# Patient Record
Sex: MALE | Race: OTHER | HISPANIC OR LATINO
[De-identification: names, ages, dates, MRNs, and addresses within clinical notes are randomized per-mention and may not be internally consistent; named-entity substitution may affect disease eponyms.]

---

## 2021-07-06 PROBLEM — Z00.00 ENCOUNTER FOR PREVENTIVE HEALTH EXAMINATION: Status: ACTIVE | Noted: 2021-07-06

## 2021-07-09 ENCOUNTER — APPOINTMENT (OUTPATIENT)
Dept: NEUROSURGERY | Facility: CLINIC | Age: 46
End: 2021-07-09
Payer: MEDICAID

## 2021-07-09 PROCEDURE — 99204 OFFICE O/P NEW MOD 45 MIN: CPT

## 2021-07-09 PROCEDURE — 99072 ADDL SUPL MATRL&STAF TM PHE: CPT

## 2021-07-12 NOTE — PHYSICAL EXAM
[General Appearance - Alert] : alert [General Appearance - In No Acute Distress] : in no acute distress [Oriented To Time, Place, And Person] : oriented to person, place, and time [Impaired Insight] : insight and judgment were intact [4] : L3 quadriceps 4/5 [5] : L4/5 ankle dorsiflexors 5/5 [Straight-Leg Raise Test - Left] : straight leg raise of the left leg was positive [Antalgic] : antalgic [Sclera] : the sclera and conjunctiva were normal [PERRL With Normal Accommodation] : pupils were equal in size, round, reactive to light, with normal accommodation [Hearing Threshold Finger Rub Not [place]] : hearing was normal [Neck Appearance] : the appearance of the neck was normal [] : no respiratory distress [Respiration, Rhythm And Depth] : normal respiratory rhythm and effort [Edema] : there was no peripheral edema [Involuntary Movements] : no involuntary movements were seen [Skin Color & Pigmentation] : normal skin color and pigmentation [Tremor] : no tremor present [FreeTextEntry8] : a [FreeTextEntry1] : amb with cane, steady gait, mild BLE weakness, RUE weakness/ hand mild atrophy

## 2021-07-12 NOTE — DATA REVIEWED
[de-identified] : 6/14/21 [facility] - MRI Lumbar wo - Disc viewed/ report in allscripts

## 2021-07-12 NOTE — REVIEW OF SYSTEMS
[Hand Weakness] :  hand weakness [Leg Weakness] : leg weakness [Numbness] : numbness [Tingling] : tingling [Difficulty Walking] : difficulty walking [Limping] : limping [As Noted in HPI] : as noted in HPI [Limb Pain] : limb pain [Negative] : Endocrine [Abdominal Pain] : no abdominal pain [Vomiting] : no vomiting [Diarrhea] : no diarrhea [Incontinence] : no incontinence [Skin Lesions] : no skin lesions [Easy Bleeding] : no tendency for easy bleeding [Easy Bruising] : no tendency for easy bruising [de-identified] : Denies a/c, a/p

## 2021-07-12 NOTE — REASON FOR VISIT
[Consultation] : a consultation visit [Referred By: _________] : Patient was referred by MICAH [FreeTextEntry1] : chronic back pain

## 2021-07-12 NOTE — HISTORY OF PRESENT ILLNESS
[> 3 months] : more  than 3 months [FreeTextEntry1] : chronic back/ Right leg pain with onset of about 8 years [de-identified] : 45 yo male with PMH of HTN, chronic neck and back pain, Lumbar surgery 2002 ([place] ?).  He complains of years of neck, RUE, low back and Left leg pain.  He has been in pain management for about 8 years currently with Dr. Delgadillo for about 2 years.  He has received multiple injections both cervical and lumbar, PT for years, with non lasting partial relief.  \par \par 6/14/21 [facility] - MRI lumbar wo - report scanned in allscripts\par \par Pain:\par Cervical - neck - RUE pain/ weakness of hand, atrophy, lateral forearm, 4th,5th digits\par Pain: Low back\par Left LE - severe pain\par aggravated with ambulation, standing\par Pain management:  Dr. Delgadillo - many years - multiple modalities, many injections/ ESCI - no relief\par PT many years - non lasting partial releif\par Pain medications currently include Oxycodone 5 mg, Etodolac, and Gabapentin 600 mg 1 tab qd.

## 2021-07-21 ENCOUNTER — RESULT REVIEW (OUTPATIENT)
Age: 46
End: 2021-07-21

## 2021-07-21 ENCOUNTER — OUTPATIENT (OUTPATIENT)
Dept: OUTPATIENT SERVICES | Facility: HOSPITAL | Age: 46
LOS: 1 days | End: 2021-07-21
Payer: MEDICAID

## 2021-07-21 ENCOUNTER — APPOINTMENT (OUTPATIENT)
Dept: CT IMAGING | Facility: CLINIC | Age: 46
End: 2021-07-21

## 2021-07-21 ENCOUNTER — APPOINTMENT (OUTPATIENT)
Dept: RADIOLOGY | Facility: CLINIC | Age: 46
End: 2021-07-21

## 2021-07-21 PROCEDURE — 72125 CT NECK SPINE W/O DYE: CPT | Mod: 26

## 2021-07-21 PROCEDURE — 72110 X-RAY EXAM L-2 SPINE 4/>VWS: CPT | Mod: 26

## 2021-07-21 PROCEDURE — 72040 X-RAY EXAM NECK SPINE 2-3 VW: CPT | Mod: 26

## 2021-08-13 ENCOUNTER — APPOINTMENT (OUTPATIENT)
Dept: NEUROSURGERY | Facility: CLINIC | Age: 46
End: 2021-08-13
Payer: MEDICAID

## 2021-08-20 ENCOUNTER — APPOINTMENT (OUTPATIENT)
Dept: NEUROSURGERY | Facility: CLINIC | Age: 46
End: 2021-08-20
Payer: MEDICAID

## 2021-08-20 VITALS
SYSTOLIC BLOOD PRESSURE: 118 MMHG | HEIGHT: 68 IN | OXYGEN SATURATION: 99 % | BODY MASS INDEX: 24.25 KG/M2 | RESPIRATION RATE: 12 BRPM | TEMPERATURE: 98.7 F | WEIGHT: 160 LBS | DIASTOLIC BLOOD PRESSURE: 78 MMHG | HEART RATE: 56 BPM

## 2021-08-20 DIAGNOSIS — M54.9 DORSALGIA, UNSPECIFIED: ICD-10-CM

## 2021-08-20 PROCEDURE — 99214 OFFICE O/P EST MOD 30 MIN: CPT

## 2021-08-20 PROCEDURE — 99072 ADDL SUPL MATRL&STAF TM PHE: CPT

## 2021-08-27 PROBLEM — M54.9 PAIN IN BACK: Status: ACTIVE | Noted: 2021-08-27

## 2021-09-04 NOTE — HISTORY OF PRESENT ILLNESS
[FreeTextEntry1] : 45 yo male with PMH of HTN, chronic neck and back pain, Lumbar surgery 2002 ([place] ?).  He complains of years of neck, RUE, low back and Left leg pain.  He has been in pain management for about 8 years currently with Dr. Delgadillo for about 2 years.  He has received multiple injections both cervical and lumbar, PT for years, with non lasting partial relief.  \par \par Also complains of gait imbalance \par \par 6/14/21 [facility] - MRI lumbar wo - report scanned in allscripts\par \par Pain:\par Cervical - neck - RUE pain/ weakness of hand, atrophy, lateral forearm, 4th,5th digits\par Pain: Low back\par Left LE - severe pain\par aggravated with ambulation, standing\par Pain management:  Dr. Delgadillo - many years - multiple modalities, many injections/ ESCI - no relief\par PT many years - non lasting partial releif\par Pain medications currently include Oxycodone 5 mg, Etodolac, and Gabapentin 600 mg 1 tab qd.

## 2021-09-04 NOTE — PHYSICAL EXAM
[General Appearance - Alert] : alert [General Appearance - In No Acute Distress] : in no acute distress [Oriented To Time, Place, And Person] : oriented to person, place, and time [Impaired Insight] : insight and judgment were intact [4] : L3 quadriceps 4/5 [5] : L4/5 ankle dorsiflexors 5/5 [Straight-Leg Raise Test - Left] : straight leg raise of the left leg was positive [Antalgic] : antalgic [Sclera] : the sclera and conjunctiva were normal [PERRL With Normal Accommodation] : pupils were equal in size, round, reactive to light, with normal accommodation [Hearing Threshold Finger Rub Not [place]] : hearing was normal [Neck Appearance] : the appearance of the neck was normal [] : no respiratory distress [Respiration, Rhythm And Depth] : normal respiratory rhythm and effort [Edema] : there was no peripheral edema [Involuntary Movements] : no involuntary movements were seen [Skin Color & Pigmentation] : normal skin color and pigmentation [Tremor] : no tremor present [FreeTextEntry1] : amb with cane, steady gait, mild BLE weakness, RUE weakness/ hand mild atrophy

## 2021-09-04 NOTE — REASON FOR VISIT
[FreeTextEntry1] : [facility] - reports scanned to allscripts\par  \par 7/21/21\par CT Cervical wo - full report below \par Xrays Cervical flex/ext\par Xrasy Lumbar flex/ext\par \par ?? CT Lumbar

## 2021-09-04 NOTE — ASSESSMENT
[FreeTextEntry1] : MRI Thoracic spine to evaluate soft tissue, nerves, and spinal canal to assess for thoracic cord compression with gait and balance difficulties.\par \par Patient is most symptomatic from lumbar stenosis with spondylolisthesis\par \par Will plan for L4-5 transforaminal lumbar interbody fusion

## 2021-09-04 NOTE — RESULTS/DATA
[FreeTextEntry1] : [facility]\par    [facility] Department of Radiology\par   Radiology Report\par \par Patient Name: JUANIS VILLEGAS   Report Date: 23-Jul-2021 19:22.00 \par Patient ID: [id number] ([id number]), [id number] ([id number])  Accession No.: [id number] \par Patient Birth Date: 1975  Report Status: F \par Referring Physician: [id number] MANIATIS-FITTING XOCHITL   Reason For Study: Z00.8  \par \par EXAM: CT CERVICAL SPINE\par \par PROCEDURE DATE: 07/21/2021\par \par INTERPRETATION: CLINICAL INDICATIONS: 0Z00.8, preop\par \par COMPARISON: X-ray dated 7/21/2021.\par \par TECHNIQUE: Noncontrast CT of the cervical spine. Multiple contiguous axial images through the cervical spine as well as multiplanar reformatted images are submitted for interpretation without the administration of intravenous contrast.\par \par FINDINGS: Moderate chronic height loss involves the C4-C5 and C6 vertebral bodies. Mild chronic height loss involves the C3 vertebral body. Large anterior bridging osteophytes at the C3/C4 through C6/C7 levels. Mild multilevel facet arthropathy. Moderate multilevel uncovertebral hypertrophy. Small 5 mm degenerative calcifications adjacent to the bilateral C1 lateral masses immediately. This is best seen on image 21 of series 3. There is no evidence for acute fracture. A normal lordosis is noted. Craniocervical junction is normal. The remaining cervicovertebral body heights and remaining intervertebral disc spaces are preserved. There is no prevertebral soft tissue abnormality. The odontoid process is intact.\par \par Thyroid gland is unremarkable. The airway is patent. The upper lung apices are unremarkable.\par \par Evaluation of the individual levels:\par \par C2/C3 level: Tiny midline disc protrusion.No spinal canal stenosis or foraminal narrowing.\par \par C3/C4 level: Broad-based ridging causing moderate left-sided foraminal narrowing. No spinal canal stenosis or right-sided foraminal narrowing.\par \par C4/C5 level: Broad-based ridging causing severe left-sided foraminal narrowing moderate to severe right-sided foraminal narrowing. Mild spinal canal stenosis.\par \par C5/C6 level: Broad-based ridging causing severe bilateral foraminal narrowing. No spinal canal stenosis.\par \par C6/C7 level: Broad-based ridging causing moderate right-sided foraminal narrowing and mild left-sided foraminal narrowing. No spinal canal stenosis.\par \par C7/T1 level: No spinal canal stenosis or foraminal narrowing.\par \par Consider MRI as clinically warranted.\par \par IMPRESSION:\par \par Moderate spondylosis, as above. No acute osseous abnormality.\par \par --- End of Report ---\par \par MARIA EUGENIA MADDOX MD; Attending Radiologist\par This document has been electronically signed. Jul 23 2021 7:22PM

## 2021-09-04 NOTE — REVIEW OF SYSTEMS
[Hand Weakness] :  hand weakness [Leg Weakness] : leg weakness [Numbness] : numbness [Tingling] : tingling [Difficulty Walking] : difficulty walking [Limping] : limping [As Noted in HPI] : as noted in HPI [Limb Pain] : limb pain [Negative] : Endocrine [Abdominal Pain] : no abdominal pain [Vomiting] : no vomiting [Diarrhea] : no diarrhea [Incontinence] : no incontinence [Skin Lesions] : no skin lesions [Easy Bleeding] : no tendency for easy bleeding [Easy Bruising] : no tendency for easy bruising [de-identified] : Denies a/c, a/p

## 2021-09-07 ENCOUNTER — RESULT REVIEW (OUTPATIENT)
Age: 46
End: 2021-09-07

## 2021-09-07 ENCOUNTER — OUTPATIENT (OUTPATIENT)
Dept: OUTPATIENT SERVICES | Facility: HOSPITAL | Age: 46
LOS: 1 days | End: 2021-09-07

## 2021-09-07 ENCOUNTER — APPOINTMENT (OUTPATIENT)
Dept: MRI IMAGING | Facility: CLINIC | Age: 46
End: 2021-09-07
Payer: MEDICAID

## 2021-09-07 ENCOUNTER — APPOINTMENT (OUTPATIENT)
Dept: CT IMAGING | Facility: CLINIC | Age: 46
End: 2021-09-07
Payer: MEDICAID

## 2021-09-07 PROCEDURE — 72131 CT LUMBAR SPINE W/O DYE: CPT | Mod: 26

## 2021-09-21 ENCOUNTER — APPOINTMENT (OUTPATIENT)
Dept: NEUROSURGERY | Facility: CLINIC | Age: 46
End: 2021-09-21

## 2021-09-30 ENCOUNTER — TRANSCRIPTION ENCOUNTER (OUTPATIENT)
Age: 46
End: 2021-09-30

## 2021-09-30 VITALS
OXYGEN SATURATION: 100 % | HEIGHT: 68 IN | HEART RATE: 53 BPM | WEIGHT: 157.63 LBS | SYSTOLIC BLOOD PRESSURE: 108 MMHG | DIASTOLIC BLOOD PRESSURE: 61 MMHG | TEMPERATURE: 98 F | RESPIRATION RATE: 16 BRPM

## 2021-10-01 ENCOUNTER — APPOINTMENT (OUTPATIENT)
Dept: NEUROSURGERY | Facility: HOSPITAL | Age: 46
End: 2021-10-01

## 2021-10-01 ENCOUNTER — INPATIENT (INPATIENT)
Facility: HOSPITAL | Age: 46
LOS: 2 days | Discharge: HOME | DRG: 455 | End: 2021-10-04
Attending: STUDENT IN AN ORGANIZED HEALTH CARE EDUCATION/TRAINING PROGRAM | Admitting: STUDENT IN AN ORGANIZED HEALTH CARE EDUCATION/TRAINING PROGRAM
Payer: COMMERCIAL

## 2021-10-01 DIAGNOSIS — Z98.890 OTHER SPECIFIED POSTPROCEDURAL STATES: Chronic | ICD-10-CM

## 2021-10-01 DIAGNOSIS — M51.26 OTHER INTERVERTEBRAL DISC DISPLACEMENT, LUMBAR REGION: ICD-10-CM

## 2021-10-01 LAB
ANION GAP SERPL CALC-SCNC: 7 MMOL/L — SIGNIFICANT CHANGE UP (ref 5–17)
BASOPHILS # BLD AUTO: 0.04 K/UL — SIGNIFICANT CHANGE UP (ref 0–0.2)
BASOPHILS NFR BLD AUTO: 0.4 % — SIGNIFICANT CHANGE UP (ref 0–2)
BLD GP AB SCN SERPL QL: NEGATIVE — SIGNIFICANT CHANGE UP
BUN SERPL-MCNC: 17 MG/DL — SIGNIFICANT CHANGE UP (ref 7–23)
CALCIUM SERPL-MCNC: 9.9 MG/DL — SIGNIFICANT CHANGE UP (ref 8.4–10.5)
CHLORIDE SERPL-SCNC: 104 MMOL/L — SIGNIFICANT CHANGE UP (ref 96–108)
CO2 SERPL-SCNC: 31 MMOL/L — SIGNIFICANT CHANGE UP (ref 22–31)
CREAT SERPL-MCNC: 1.3 MG/DL — SIGNIFICANT CHANGE UP (ref 0.5–1.3)
EOSINOPHIL # BLD AUTO: 0.01 K/UL — SIGNIFICANT CHANGE UP (ref 0–0.5)
EOSINOPHIL NFR BLD AUTO: 0.1 % — SIGNIFICANT CHANGE UP (ref 0–6)
GLUCOSE SERPL-MCNC: 137 MG/DL — HIGH (ref 70–99)
HCT VFR BLD CALC: 43.2 % — SIGNIFICANT CHANGE UP (ref 39–50)
HGB BLD-MCNC: 14 G/DL — SIGNIFICANT CHANGE UP (ref 13–17)
IMM GRANULOCYTES NFR BLD AUTO: 1 % — SIGNIFICANT CHANGE UP (ref 0–1.5)
LYMPHOCYTES # BLD AUTO: 1.25 K/UL — SIGNIFICANT CHANGE UP (ref 1–3.3)
LYMPHOCYTES # BLD AUTO: 13.4 % — SIGNIFICANT CHANGE UP (ref 13–44)
MCHC RBC-ENTMCNC: 28.8 PG — SIGNIFICANT CHANGE UP (ref 27–34)
MCHC RBC-ENTMCNC: 32.4 GM/DL — SIGNIFICANT CHANGE UP (ref 32–36)
MCV RBC AUTO: 88.9 FL — SIGNIFICANT CHANGE UP (ref 80–100)
MONOCYTES # BLD AUTO: 0.1 K/UL — SIGNIFICANT CHANGE UP (ref 0–0.9)
MONOCYTES NFR BLD AUTO: 1.1 % — LOW (ref 2–14)
NEUTROPHILS # BLD AUTO: 7.81 K/UL — HIGH (ref 1.8–7.4)
NEUTROPHILS NFR BLD AUTO: 84 % — HIGH (ref 43–77)
NRBC # BLD: 0 /100 WBCS — SIGNIFICANT CHANGE UP (ref 0–0)
PLATELET # BLD AUTO: 163 K/UL — SIGNIFICANT CHANGE UP (ref 150–400)
POTASSIUM SERPL-MCNC: 4 MMOL/L — SIGNIFICANT CHANGE UP (ref 3.5–5.3)
POTASSIUM SERPL-SCNC: 4 MMOL/L — SIGNIFICANT CHANGE UP (ref 3.5–5.3)
RBC # BLD: 4.86 M/UL — SIGNIFICANT CHANGE UP (ref 4.2–5.8)
RBC # FLD: 12.5 % — SIGNIFICANT CHANGE UP (ref 10.3–14.5)
RH IG SCN BLD-IMP: POSITIVE — SIGNIFICANT CHANGE UP
SODIUM SERPL-SCNC: 142 MMOL/L — SIGNIFICANT CHANGE UP (ref 135–145)
WBC # BLD: 9.3 K/UL — SIGNIFICANT CHANGE UP (ref 3.8–10.5)
WBC # FLD AUTO: 9.3 K/UL — SIGNIFICANT CHANGE UP (ref 3.8–10.5)

## 2021-10-01 PROCEDURE — 61783 SCAN PROC SPINAL: CPT | Mod: 59

## 2021-10-01 PROCEDURE — 22853 INSJ BIOMECHANICAL DEVICE: CPT | Mod: 80

## 2021-10-01 PROCEDURE — 72131 CT LUMBAR SPINE W/O DYE: CPT | Mod: 26

## 2021-10-01 PROCEDURE — 22633 ARTHRD CMBN 1NTRSPC LUMBAR: CPT | Mod: 62

## 2021-10-01 PROCEDURE — 20939 BONE MARROW ASPIR BONE GRFG: CPT | Mod: 80

## 2021-10-01 PROCEDURE — 22853 INSJ BIOMECHANICAL DEVICE: CPT

## 2021-10-01 PROCEDURE — 63047 LAM FACETEC & FORAMOT LUMBAR: CPT | Mod: 59,62

## 2021-10-01 PROCEDURE — 22840 INSERT SPINE FIXATION DEVICE: CPT

## 2021-10-01 PROCEDURE — 20939 BONE MARROW ASPIR BONE GRFG: CPT

## 2021-10-01 PROCEDURE — 61783 SCAN PROC SPINAL: CPT | Mod: 80

## 2021-10-01 PROCEDURE — 63047 LAM FACETEC & FORAMOT LUMBAR: CPT | Mod: 62,59

## 2021-10-01 PROCEDURE — 22840 INSERT SPINE FIXATION DEVICE: CPT | Mod: 80

## 2021-10-01 RX ORDER — GABAPENTIN 400 MG/1
1 CAPSULE ORAL
Qty: 0 | Refills: 0 | DISCHARGE

## 2021-10-01 RX ORDER — BUPRENORPHINE AND NALOXONE 2; .5 MG/1; MG/1
1 TABLET SUBLINGUAL
Qty: 0 | Refills: 0 | DISCHARGE

## 2021-10-01 RX ORDER — CELECOXIB 200 MG/1
200 CAPSULE ORAL ONCE
Refills: 0 | Status: COMPLETED | OUTPATIENT
Start: 2021-10-01 | End: 2021-10-01

## 2021-10-01 RX ORDER — METHOCARBAMOL 500 MG/1
500 TABLET, FILM COATED ORAL EVERY 8 HOURS
Refills: 0 | Status: DISCONTINUED | OUTPATIENT
Start: 2021-10-01 | End: 2021-10-04

## 2021-10-01 RX ORDER — SENNA PLUS 8.6 MG/1
2 TABLET ORAL AT BEDTIME
Refills: 0 | Status: DISCONTINUED | OUTPATIENT
Start: 2021-10-01 | End: 2021-10-04

## 2021-10-01 RX ORDER — ACETAMINOPHEN 500 MG
1000 TABLET ORAL ONCE
Refills: 0 | Status: COMPLETED | OUTPATIENT
Start: 2021-10-01 | End: 2021-10-01

## 2021-10-01 RX ORDER — SODIUM CHLORIDE 9 MG/ML
1000 INJECTION, SOLUTION INTRAVENOUS
Refills: 0 | Status: DISCONTINUED | OUTPATIENT
Start: 2021-10-01 | End: 2021-10-02

## 2021-10-01 RX ORDER — METOCLOPRAMIDE HCL 10 MG
10 TABLET ORAL ONCE
Refills: 0 | Status: DISCONTINUED | OUTPATIENT
Start: 2021-10-01 | End: 2021-10-04

## 2021-10-01 RX ORDER — POVIDONE-IODINE 5 %
1 AEROSOL (ML) TOPICAL ONCE
Refills: 0 | Status: COMPLETED | OUTPATIENT
Start: 2021-10-01 | End: 2021-10-01

## 2021-10-01 RX ORDER — GABAPENTIN 400 MG/1
300 CAPSULE ORAL ONCE
Refills: 0 | Status: COMPLETED | OUTPATIENT
Start: 2021-10-01 | End: 2021-10-01

## 2021-10-01 RX ORDER — LISINOPRIL 2.5 MG/1
20 TABLET ORAL DAILY
Refills: 0 | Status: DISCONTINUED | OUTPATIENT
Start: 2021-10-02 | End: 2021-10-04

## 2021-10-01 RX ORDER — HYDROMORPHONE HYDROCHLORIDE 2 MG/ML
0.5 INJECTION INTRAMUSCULAR; INTRAVENOUS; SUBCUTANEOUS
Refills: 0 | Status: DISCONTINUED | OUTPATIENT
Start: 2021-10-01 | End: 2021-10-04

## 2021-10-01 RX ORDER — ONDANSETRON 8 MG/1
4 TABLET, FILM COATED ORAL EVERY 6 HOURS
Refills: 0 | Status: COMPLETED | OUTPATIENT
Start: 2021-10-01 | End: 2021-10-02

## 2021-10-01 RX ORDER — CLONAZEPAM 1 MG
0.5 TABLET ORAL
Refills: 0 | Status: DISCONTINUED | OUTPATIENT
Start: 2021-10-01 | End: 2021-10-04

## 2021-10-01 RX ORDER — LISINOPRIL/HYDROCHLOROTHIAZIDE 10-12.5 MG
1 TABLET ORAL
Qty: 0 | Refills: 0 | DISCHARGE

## 2021-10-01 RX ORDER — ERGOCALCIFEROL 1.25 MG/1
1 CAPSULE ORAL
Qty: 0 | Refills: 0 | DISCHARGE

## 2021-10-01 RX ORDER — HYDROCHLOROTHIAZIDE 25 MG
12.5 TABLET ORAL DAILY
Refills: 0 | Status: DISCONTINUED | OUTPATIENT
Start: 2021-10-02 | End: 2021-10-02

## 2021-10-01 RX ORDER — GABAPENTIN 400 MG/1
600 CAPSULE ORAL THREE TIMES A DAY
Refills: 0 | Status: DISCONTINUED | OUTPATIENT
Start: 2021-10-01 | End: 2021-10-04

## 2021-10-01 RX ORDER — ENOXAPARIN SODIUM 100 MG/ML
40 INJECTION SUBCUTANEOUS AT BEDTIME
Refills: 0 | Status: DISCONTINUED | OUTPATIENT
Start: 2021-10-02 | End: 2021-10-04

## 2021-10-01 RX ORDER — CHLORHEXIDINE GLUCONATE 213 G/1000ML
1 SOLUTION TOPICAL EVERY 12 HOURS
Refills: 0 | Status: DISCONTINUED | OUTPATIENT
Start: 2021-10-01 | End: 2021-10-01

## 2021-10-01 RX ORDER — OXYCODONE HYDROCHLORIDE 5 MG/1
5 TABLET ORAL EVERY 4 HOURS
Refills: 0 | Status: DISCONTINUED | OUTPATIENT
Start: 2021-10-01 | End: 2021-10-04

## 2021-10-01 RX ORDER — LIDOCAINE 4 G/100G
0 CREAM TOPICAL
Qty: 0 | Refills: 0 | DISCHARGE

## 2021-10-01 RX ORDER — CEFAZOLIN SODIUM 1 G
1000 VIAL (EA) INJECTION EVERY 8 HOURS
Refills: 0 | Status: COMPLETED | OUTPATIENT
Start: 2021-10-01 | End: 2021-10-02

## 2021-10-01 RX ORDER — APREPITANT 80 MG/1
40 CAPSULE ORAL ONCE
Refills: 0 | Status: COMPLETED | OUTPATIENT
Start: 2021-10-01 | End: 2021-10-01

## 2021-10-01 RX ORDER — CLONAZEPAM 1 MG
1 TABLET ORAL
Qty: 0 | Refills: 0 | DISCHARGE

## 2021-10-01 RX ORDER — ACETAMINOPHEN 500 MG
1000 TABLET ORAL EVERY 6 HOURS
Refills: 0 | Status: DISCONTINUED | OUTPATIENT
Start: 2021-10-01 | End: 2021-10-04

## 2021-10-01 RX ADMIN — METHOCARBAMOL 500 MILLIGRAM(S): 500 TABLET, FILM COATED ORAL at 23:20

## 2021-10-01 RX ADMIN — HYDROMORPHONE HYDROCHLORIDE 0.5 MILLIGRAM(S): 2 INJECTION INTRAMUSCULAR; INTRAVENOUS; SUBCUTANEOUS at 13:56

## 2021-10-01 RX ADMIN — Medication 100 MILLIGRAM(S): at 21:45

## 2021-10-01 RX ADMIN — HYDROMORPHONE HYDROCHLORIDE 0.5 MILLIGRAM(S): 2 INJECTION INTRAMUSCULAR; INTRAVENOUS; SUBCUTANEOUS at 14:55

## 2021-10-01 RX ADMIN — APREPITANT 40 MILLIGRAM(S): 80 CAPSULE ORAL at 07:20

## 2021-10-01 RX ADMIN — CELECOXIB 200 MILLIGRAM(S): 200 CAPSULE ORAL at 07:26

## 2021-10-01 RX ADMIN — METHOCARBAMOL 500 MILLIGRAM(S): 500 TABLET, FILM COATED ORAL at 15:41

## 2021-10-01 RX ADMIN — GABAPENTIN 600 MILLIGRAM(S): 400 CAPSULE ORAL at 15:11

## 2021-10-01 RX ADMIN — SENNA PLUS 2 TABLET(S): 8.6 TABLET ORAL at 21:45

## 2021-10-01 RX ADMIN — Medication 100 MILLIGRAM(S): at 15:40

## 2021-10-01 RX ADMIN — HYDROMORPHONE HYDROCHLORIDE 0.5 MILLIGRAM(S): 2 INJECTION INTRAMUSCULAR; INTRAVENOUS; SUBCUTANEOUS at 18:20

## 2021-10-01 RX ADMIN — Medication 1000 MILLIGRAM(S): at 07:27

## 2021-10-01 RX ADMIN — SODIUM CHLORIDE 75 MILLILITER(S): 9 INJECTION, SOLUTION INTRAVENOUS at 15:49

## 2021-10-01 RX ADMIN — HYDROMORPHONE HYDROCHLORIDE 0.5 MILLIGRAM(S): 2 INJECTION INTRAMUSCULAR; INTRAVENOUS; SUBCUTANEOUS at 16:56

## 2021-10-01 RX ADMIN — Medication 1000 MILLIGRAM(S): at 23:20

## 2021-10-01 RX ADMIN — Medication 1000 MILLIGRAM(S): at 07:19

## 2021-10-01 RX ADMIN — GABAPENTIN 600 MILLIGRAM(S): 400 CAPSULE ORAL at 21:56

## 2021-10-01 RX ADMIN — CELECOXIB 200 MILLIGRAM(S): 200 CAPSULE ORAL at 07:19

## 2021-10-01 RX ADMIN — Medication 0.5 MILLIGRAM(S): at 19:41

## 2021-10-01 RX ADMIN — GABAPENTIN 300 MILLIGRAM(S): 400 CAPSULE ORAL at 07:19

## 2021-10-01 RX ADMIN — Medication 1000 MILLIGRAM(S): at 17:27

## 2021-10-01 RX ADMIN — ONDANSETRON 4 MILLIGRAM(S): 8 TABLET, FILM COATED ORAL at 19:43

## 2021-10-01 RX ADMIN — ONDANSETRON 4 MILLIGRAM(S): 8 TABLET, FILM COATED ORAL at 23:20

## 2021-10-01 RX ADMIN — CHLORHEXIDINE GLUCONATE 1 APPLICATION(S): 213 SOLUTION TOPICAL at 07:27

## 2021-10-01 RX ADMIN — Medication 1000 MILLIGRAM(S): at 18:20

## 2021-10-01 RX ADMIN — Medication 1 APPLICATION(S): at 07:26

## 2021-10-01 NOTE — H&P ADULT - HISTORY OF PRESENT ILLNESS
47 yo male with PMH of HTN, chronic neck and back pain, Lumbar surgery 2002 ([place] ?). He complains of years of neck, RUE, low back and Left leg pain. He has been in pain management for about 8 years currently with Dr. Delgadillo for about 2 years. He has received multiple injections both cervical and lumbar, PT for years, with non lasting partial relief.  45 yo male, Right handed, PMH: HTN, chronic neck and back pain, Lumbar surgery 2002 unknown levels and surgeon. He complains of years of neck, RUE radiculopathy, low back and Left leg radiculopathy. He has been in pain management for about 8 years currently with Dr. Delgadillo for about 2 years. He has received multiple steroid injections both cervical and lumbar, PT for years, with non lasting partial relief. Pt reports difficulty with initiating urination. Pt ambulates with a cane.  Pt denies sob, cp, n/v, chills/fever, saddle paresthesia, dysuria.

## 2021-10-01 NOTE — BRIEF OPERATIVE NOTE - NSICDXBRIEFPREOP_GEN_ALL_CORE_FT
PRE-OP DIAGNOSIS:  HNP (herniated nucleus pulposus), lumbar 01-Oct-2021 13:54:53 L4-L5 Diony Almaguer

## 2021-10-01 NOTE — H&P ADULT - ASSESSMENT
47 yo male, Right handed, PMH: HTN, chronic neck and back pain, Lumbar surgery 2002 unknown levels and surgeon. He complains of years of neck, RUE radiculopathy, low back and Left leg radiculopathy. He has been in pain management for about 8 years currently with Dr. Delgadillo for about 2 years. He has received multiple steroid injections both cervical and lumbar, PT for years, with non lasting partial relief. Pt reports difficulty with initiating urination. Pt ambulates with a cane.  Medical clearance is in the chart. Plan for L4-5 transforaminal lumbar interbody fusion with Dr. Alicea and Dr Pride

## 2021-10-01 NOTE — H&P ADULT - NSHPPHYSICALEXAM_GEN_ALL_CORE
AA&OX3, mild discomfort due to neck and lower back pain, coherent speech  CNs II-XII grossly intact  motor:  Right: C5 Biceps 4/5, C7 triceps 4/5, HG 4/5, L2 Iliopsoas 4/5, L3 quadriceps 4/5, L4/5 ankle dorsiflexors 4/5, plantar flex 5/5, EHL 4/5  Left: C5 Biceps 5/5, C7 triceps 5/5, HG 4/5, L2 Iliopsoas 4/5, L3 quadriceps 4/5, L4/5 ankle dorsiflexors 4/5, plantar flex 5/5, EHL 4/5  sensation to LT diminished RUE and LLE, o/w grossly intact.  Back: incision healed well.  Card: S1S2, NSR  Pulm: CTA  Abd: soft, non tender, nondistended, + BS

## 2021-10-01 NOTE — H&P ADULT - NSICDXPASTSURGICALHX_GEN_ALL_CORE_FT
PAST SURGICAL HISTORY:  H/O: knee surgery right    History of back surgery     History of back surgery

## 2021-10-01 NOTE — H&P ADULT - NSICDXPASTMEDICALHX_GEN_ALL_CORE_FT
PAST MEDICAL HISTORY:  Falls weakness to b/l legs; walks with cane for stability; fell down at home about a month ago    H/O fatigue     HTN (hypertension)

## 2021-10-02 DIAGNOSIS — R00.1 BRADYCARDIA, UNSPECIFIED: ICD-10-CM

## 2021-10-02 DIAGNOSIS — I10 ESSENTIAL (PRIMARY) HYPERTENSION: ICD-10-CM

## 2021-10-02 DIAGNOSIS — D72.829 ELEVATED WHITE BLOOD CELL COUNT, UNSPECIFIED: ICD-10-CM

## 2021-10-02 DIAGNOSIS — N18.2 CHRONIC KIDNEY DISEASE, STAGE 2 (MILD): ICD-10-CM

## 2021-10-02 DIAGNOSIS — Z98.890 OTHER SPECIFIED POSTPROCEDURAL STATES: ICD-10-CM

## 2021-10-02 DIAGNOSIS — D64.9 ANEMIA, UNSPECIFIED: ICD-10-CM

## 2021-10-02 LAB
ANION GAP SERPL CALC-SCNC: 9 MMOL/L — SIGNIFICANT CHANGE UP (ref 5–17)
BASOPHILS # BLD AUTO: 0.02 K/UL — SIGNIFICANT CHANGE UP (ref 0–0.2)
BASOPHILS NFR BLD AUTO: 0.1 % — SIGNIFICANT CHANGE UP (ref 0–2)
BUN SERPL-MCNC: 22 MG/DL — SIGNIFICANT CHANGE UP (ref 7–23)
CALCIUM SERPL-MCNC: 9.2 MG/DL — SIGNIFICANT CHANGE UP (ref 8.4–10.5)
CHLORIDE SERPL-SCNC: 104 MMOL/L — SIGNIFICANT CHANGE UP (ref 96–108)
CO2 SERPL-SCNC: 27 MMOL/L — SIGNIFICANT CHANGE UP (ref 22–31)
CREAT SERPL-MCNC: 1.31 MG/DL — HIGH (ref 0.5–1.3)
EOSINOPHIL # BLD AUTO: 0 K/UL — SIGNIFICANT CHANGE UP (ref 0–0.5)
EOSINOPHIL NFR BLD AUTO: 0 % — SIGNIFICANT CHANGE UP (ref 0–6)
GLUCOSE SERPL-MCNC: 110 MG/DL — HIGH (ref 70–99)
HCT VFR BLD CALC: 36.1 % — LOW (ref 39–50)
HGB BLD-MCNC: 12.1 G/DL — LOW (ref 13–17)
IMM GRANULOCYTES NFR BLD AUTO: 0.5 % — SIGNIFICANT CHANGE UP (ref 0–1.5)
LYMPHOCYTES # BLD AUTO: 1.83 K/UL — SIGNIFICANT CHANGE UP (ref 1–3.3)
LYMPHOCYTES # BLD AUTO: 13.1 % — SIGNIFICANT CHANGE UP (ref 13–44)
MAGNESIUM SERPL-MCNC: 1.8 MG/DL — SIGNIFICANT CHANGE UP (ref 1.6–2.6)
MCHC RBC-ENTMCNC: 29.7 PG — SIGNIFICANT CHANGE UP (ref 27–34)
MCHC RBC-ENTMCNC: 33.5 GM/DL — SIGNIFICANT CHANGE UP (ref 32–36)
MCV RBC AUTO: 88.7 FL — SIGNIFICANT CHANGE UP (ref 80–100)
MONOCYTES # BLD AUTO: 0.95 K/UL — HIGH (ref 0–0.9)
MONOCYTES NFR BLD AUTO: 6.8 % — SIGNIFICANT CHANGE UP (ref 2–14)
NEUTROPHILS # BLD AUTO: 11.11 K/UL — HIGH (ref 1.8–7.4)
NEUTROPHILS NFR BLD AUTO: 79.5 % — HIGH (ref 43–77)
NRBC # BLD: 0 /100 WBCS — SIGNIFICANT CHANGE UP (ref 0–0)
PHOSPHATE SERPL-MCNC: 3.6 MG/DL — SIGNIFICANT CHANGE UP (ref 2.5–4.5)
PLATELET # BLD AUTO: 164 K/UL — SIGNIFICANT CHANGE UP (ref 150–400)
POTASSIUM SERPL-MCNC: 4.5 MMOL/L — SIGNIFICANT CHANGE UP (ref 3.5–5.3)
POTASSIUM SERPL-SCNC: 4.5 MMOL/L — SIGNIFICANT CHANGE UP (ref 3.5–5.3)
RBC # BLD: 4.07 M/UL — LOW (ref 4.2–5.8)
RBC # FLD: 12.3 % — SIGNIFICANT CHANGE UP (ref 10.3–14.5)
SODIUM SERPL-SCNC: 140 MMOL/L — SIGNIFICANT CHANGE UP (ref 135–145)
WBC # BLD: 13.98 K/UL — HIGH (ref 3.8–10.5)
WBC # FLD AUTO: 13.98 K/UL — HIGH (ref 3.8–10.5)

## 2021-10-02 PROCEDURE — 99024 POSTOP FOLLOW-UP VISIT: CPT

## 2021-10-02 PROCEDURE — 99254 IP/OBS CNSLTJ NEW/EST MOD 60: CPT | Mod: GC

## 2021-10-02 RX ORDER — SODIUM CHLORIDE 9 MG/ML
500 INJECTION INTRAMUSCULAR; INTRAVENOUS; SUBCUTANEOUS ONCE
Refills: 0 | Status: COMPLETED | OUTPATIENT
Start: 2021-10-02 | End: 2021-10-02

## 2021-10-02 RX ADMIN — GABAPENTIN 600 MILLIGRAM(S): 400 CAPSULE ORAL at 21:52

## 2021-10-02 RX ADMIN — METHOCARBAMOL 500 MILLIGRAM(S): 500 TABLET, FILM COATED ORAL at 21:51

## 2021-10-02 RX ADMIN — LISINOPRIL 20 MILLIGRAM(S): 2.5 TABLET ORAL at 07:15

## 2021-10-02 RX ADMIN — OXYCODONE HYDROCHLORIDE 5 MILLIGRAM(S): 5 TABLET ORAL at 18:41

## 2021-10-02 RX ADMIN — ENOXAPARIN SODIUM 40 MILLIGRAM(S): 100 INJECTION SUBCUTANEOUS at 21:51

## 2021-10-02 RX ADMIN — Medication 0.1 MILLIGRAM(S): at 06:33

## 2021-10-02 RX ADMIN — GABAPENTIN 600 MILLIGRAM(S): 400 CAPSULE ORAL at 06:34

## 2021-10-02 RX ADMIN — SODIUM CHLORIDE 1000 MILLILITER(S): 9 INJECTION INTRAMUSCULAR; INTRAVENOUS; SUBCUTANEOUS at 19:26

## 2021-10-02 RX ADMIN — GABAPENTIN 600 MILLIGRAM(S): 400 CAPSULE ORAL at 13:18

## 2021-10-02 RX ADMIN — Medication 1000 MILLIGRAM(S): at 23:41

## 2021-10-02 RX ADMIN — Medication 1000 MILLIGRAM(S): at 07:30

## 2021-10-02 RX ADMIN — SENNA PLUS 2 TABLET(S): 8.6 TABLET ORAL at 21:51

## 2021-10-02 RX ADMIN — Medication 0.5 MILLIGRAM(S): at 06:33

## 2021-10-02 RX ADMIN — Medication 100 MILLIGRAM(S): at 06:32

## 2021-10-02 RX ADMIN — Medication 1000 MILLIGRAM(S): at 06:34

## 2021-10-02 RX ADMIN — OXYCODONE HYDROCHLORIDE 5 MILLIGRAM(S): 5 TABLET ORAL at 23:41

## 2021-10-02 RX ADMIN — Medication 1000 MILLIGRAM(S): at 00:00

## 2021-10-02 RX ADMIN — Medication 0.5 MILLIGRAM(S): at 17:19

## 2021-10-02 RX ADMIN — Medication 1000 MILLIGRAM(S): at 13:18

## 2021-10-02 RX ADMIN — Medication 1000 MILLIGRAM(S): at 14:18

## 2021-10-02 RX ADMIN — ONDANSETRON 4 MILLIGRAM(S): 8 TABLET, FILM COATED ORAL at 06:34

## 2021-10-02 RX ADMIN — METHOCARBAMOL 500 MILLIGRAM(S): 500 TABLET, FILM COATED ORAL at 13:18

## 2021-10-02 RX ADMIN — OXYCODONE HYDROCHLORIDE 5 MILLIGRAM(S): 5 TABLET ORAL at 19:41

## 2021-10-02 RX ADMIN — Medication 1000 MILLIGRAM(S): at 17:19

## 2021-10-02 RX ADMIN — Medication 1000 MILLIGRAM(S): at 18:19

## 2021-10-02 RX ADMIN — METHOCARBAMOL 500 MILLIGRAM(S): 500 TABLET, FILM COATED ORAL at 07:14

## 2021-10-02 RX ADMIN — Medication 5 MILLIGRAM(S): at 21:51

## 2021-10-02 NOTE — PHYSICAL THERAPY INITIAL EVALUATION ADULT - GENERAL OBSERVATIONS, REHAB EVAL
Patient received ambulating out of bathroom  in NAD on RA, +Hemovac, +Heplock. Cleared by REUBEN Russell. Agreeable to PT.

## 2021-10-02 NOTE — PROGRESS NOTE ADULT - ASSESSMENT
45 yo male, Right handed, PMH: HTN, chronic neck and back pain with RUE and LLE radiculopathy. Lumbar surgery 2002 unknown levels and surgeon. Pt ambulates with a cane at baseline. He is now s/p L4/5 TLIF 10/1/21.

## 2021-10-02 NOTE — PHYSICAL THERAPY INITIAL EVALUATION ADULT - LEVEL OF INDEPENDENCE: SUPINE/SIT, REHAB EVAL
independent Ear Star Wedge Flap Text: The defect edges were debeveled with a #15 blade scalpel.  Given the location of the defect and the proximity to free margins (helical rim) an ear star wedge flap was deemed most appropriate.  Using a sterile surgical marker, the appropriate flap was drawn incorporating the defect and placing the expected incisions between the helical rim and antihelix where possible.  The area thus outlined was incised through and through with a #15 scalpel blade.

## 2021-10-02 NOTE — PHYSICAL THERAPY INITIAL EVALUATION ADULT - PERTINENT HX OF CURRENT PROBLEM, REHAB EVAL
45 yo male, Right handed, PMH: HTN, chronic neck and back pain, Lumbar surgery 2002 unknown levels and surgeon. He complains of years of neck, RUE radiculopathy, low back and Left leg radiculopathy. He has been in pain management for about 8 years currently with Dr. Delgadillo for about 2 years. He has received multiple steroid injections both cervical and lumbar, PT for years, with non lasting partial relief. Now s/p L4/5 TLIF

## 2021-10-03 LAB
ANION GAP SERPL CALC-SCNC: 8 MMOL/L — SIGNIFICANT CHANGE UP (ref 5–17)
BASOPHILS # BLD AUTO: 0.03 K/UL — SIGNIFICANT CHANGE UP (ref 0–0.2)
BASOPHILS NFR BLD AUTO: 0.3 % — SIGNIFICANT CHANGE UP (ref 0–2)
BUN SERPL-MCNC: 19 MG/DL — SIGNIFICANT CHANGE UP (ref 7–23)
CALCIUM SERPL-MCNC: 9 MG/DL — SIGNIFICANT CHANGE UP (ref 8.4–10.5)
CHLORIDE SERPL-SCNC: 106 MMOL/L — SIGNIFICANT CHANGE UP (ref 96–108)
CO2 SERPL-SCNC: 27 MMOL/L — SIGNIFICANT CHANGE UP (ref 22–31)
CREAT SERPL-MCNC: 1.19 MG/DL — SIGNIFICANT CHANGE UP (ref 0.5–1.3)
EOSINOPHIL # BLD AUTO: 0.03 K/UL — SIGNIFICANT CHANGE UP (ref 0–0.5)
EOSINOPHIL NFR BLD AUTO: 0.3 % — SIGNIFICANT CHANGE UP (ref 0–6)
GLUCOSE SERPL-MCNC: 93 MG/DL — SIGNIFICANT CHANGE UP (ref 70–99)
HCT VFR BLD CALC: 34.5 % — LOW (ref 39–50)
HGB BLD-MCNC: 11 G/DL — LOW (ref 13–17)
IMM GRANULOCYTES NFR BLD AUTO: 0.4 % — SIGNIFICANT CHANGE UP (ref 0–1.5)
LYMPHOCYTES # BLD AUTO: 3.98 K/UL — HIGH (ref 1–3.3)
LYMPHOCYTES # BLD AUTO: 40.2 % — SIGNIFICANT CHANGE UP (ref 13–44)
MAGNESIUM SERPL-MCNC: 1.7 MG/DL — SIGNIFICANT CHANGE UP (ref 1.6–2.6)
MCHC RBC-ENTMCNC: 29 PG — SIGNIFICANT CHANGE UP (ref 27–34)
MCHC RBC-ENTMCNC: 31.9 GM/DL — LOW (ref 32–36)
MCV RBC AUTO: 91 FL — SIGNIFICANT CHANGE UP (ref 80–100)
MONOCYTES # BLD AUTO: 0.69 K/UL — SIGNIFICANT CHANGE UP (ref 0–0.9)
MONOCYTES NFR BLD AUTO: 7 % — SIGNIFICANT CHANGE UP (ref 2–14)
NEUTROPHILS # BLD AUTO: 5.12 K/UL — SIGNIFICANT CHANGE UP (ref 1.8–7.4)
NEUTROPHILS NFR BLD AUTO: 51.8 % — SIGNIFICANT CHANGE UP (ref 43–77)
NRBC # BLD: 0 /100 WBCS — SIGNIFICANT CHANGE UP (ref 0–0)
PHOSPHATE SERPL-MCNC: 3 MG/DL — SIGNIFICANT CHANGE UP (ref 2.5–4.5)
PLATELET # BLD AUTO: 133 K/UL — LOW (ref 150–400)
POTASSIUM SERPL-MCNC: 3.9 MMOL/L — SIGNIFICANT CHANGE UP (ref 3.5–5.3)
POTASSIUM SERPL-SCNC: 3.9 MMOL/L — SIGNIFICANT CHANGE UP (ref 3.5–5.3)
RBC # BLD: 3.79 M/UL — LOW (ref 4.2–5.8)
RBC # FLD: 12.8 % — SIGNIFICANT CHANGE UP (ref 10.3–14.5)
SODIUM SERPL-SCNC: 141 MMOL/L — SIGNIFICANT CHANGE UP (ref 135–145)
WBC # BLD: 9.89 K/UL — SIGNIFICANT CHANGE UP (ref 3.8–10.5)
WBC # FLD AUTO: 9.89 K/UL — SIGNIFICANT CHANGE UP (ref 3.8–10.5)

## 2021-10-03 PROCEDURE — 99024 POSTOP FOLLOW-UP VISIT: CPT

## 2021-10-03 PROCEDURE — 72141 MRI NECK SPINE W/O DYE: CPT | Mod: 26

## 2021-10-03 RX ORDER — SERTRALINE 25 MG/1
100 TABLET, FILM COATED ORAL DAILY
Refills: 0 | Status: DISCONTINUED | OUTPATIENT
Start: 2021-10-03 | End: 2021-10-04

## 2021-10-03 RX ORDER — POTASSIUM CHLORIDE 20 MEQ
20 PACKET (EA) ORAL ONCE
Refills: 0 | Status: COMPLETED | OUTPATIENT
Start: 2021-10-03 | End: 2021-10-03

## 2021-10-03 RX ORDER — MAGNESIUM SULFATE 500 MG/ML
2 VIAL (ML) INJECTION ONCE
Refills: 0 | Status: COMPLETED | OUTPATIENT
Start: 2021-10-03 | End: 2021-10-03

## 2021-10-03 RX ADMIN — Medication 1000 MILLIGRAM(S): at 17:17

## 2021-10-03 RX ADMIN — OXYCODONE HYDROCHLORIDE 5 MILLIGRAM(S): 5 TABLET ORAL at 18:19

## 2021-10-03 RX ADMIN — METHOCARBAMOL 500 MILLIGRAM(S): 500 TABLET, FILM COATED ORAL at 21:54

## 2021-10-03 RX ADMIN — Medication 20 MILLIEQUIVALENT(S): at 11:23

## 2021-10-03 RX ADMIN — OXYCODONE HYDROCHLORIDE 5 MILLIGRAM(S): 5 TABLET ORAL at 17:19

## 2021-10-03 RX ADMIN — GABAPENTIN 600 MILLIGRAM(S): 400 CAPSULE ORAL at 21:54

## 2021-10-03 RX ADMIN — Medication 0.5 MILLIGRAM(S): at 05:33

## 2021-10-03 RX ADMIN — OXYCODONE HYDROCHLORIDE 5 MILLIGRAM(S): 5 TABLET ORAL at 00:41

## 2021-10-03 RX ADMIN — Medication 1000 MILLIGRAM(S): at 11:23

## 2021-10-03 RX ADMIN — Medication 1000 MILLIGRAM(S): at 23:01

## 2021-10-03 RX ADMIN — Medication 50 GRAM(S): at 12:20

## 2021-10-03 RX ADMIN — Medication 1000 MILLIGRAM(S): at 05:33

## 2021-10-03 RX ADMIN — GABAPENTIN 600 MILLIGRAM(S): 400 CAPSULE ORAL at 14:47

## 2021-10-03 RX ADMIN — OXYCODONE HYDROCHLORIDE 5 MILLIGRAM(S): 5 TABLET ORAL at 22:53

## 2021-10-03 RX ADMIN — SENNA PLUS 2 TABLET(S): 8.6 TABLET ORAL at 21:54

## 2021-10-03 RX ADMIN — OXYCODONE HYDROCHLORIDE 5 MILLIGRAM(S): 5 TABLET ORAL at 11:23

## 2021-10-03 RX ADMIN — OXYCODONE HYDROCHLORIDE 5 MILLIGRAM(S): 5 TABLET ORAL at 21:53

## 2021-10-03 RX ADMIN — GABAPENTIN 600 MILLIGRAM(S): 400 CAPSULE ORAL at 05:33

## 2021-10-03 RX ADMIN — Medication 1000 MILLIGRAM(S): at 18:17

## 2021-10-03 RX ADMIN — METHOCARBAMOL 500 MILLIGRAM(S): 500 TABLET, FILM COATED ORAL at 13:51

## 2021-10-03 RX ADMIN — SERTRALINE 100 MILLIGRAM(S): 25 TABLET, FILM COATED ORAL at 21:54

## 2021-10-03 RX ADMIN — Medication 1000 MILLIGRAM(S): at 06:53

## 2021-10-03 RX ADMIN — METHOCARBAMOL 500 MILLIGRAM(S): 500 TABLET, FILM COATED ORAL at 05:33

## 2021-10-03 RX ADMIN — Medication 1000 MILLIGRAM(S): at 12:23

## 2021-10-03 RX ADMIN — OXYCODONE HYDROCHLORIDE 5 MILLIGRAM(S): 5 TABLET ORAL at 06:53

## 2021-10-03 RX ADMIN — LISINOPRIL 20 MILLIGRAM(S): 2.5 TABLET ORAL at 05:33

## 2021-10-03 RX ADMIN — Medication 0.5 MILLIGRAM(S): at 17:52

## 2021-10-03 RX ADMIN — ENOXAPARIN SODIUM 40 MILLIGRAM(S): 100 INJECTION SUBCUTANEOUS at 21:53

## 2021-10-03 RX ADMIN — OXYCODONE HYDROCHLORIDE 5 MILLIGRAM(S): 5 TABLET ORAL at 05:33

## 2021-10-03 RX ADMIN — OXYCODONE HYDROCHLORIDE 5 MILLIGRAM(S): 5 TABLET ORAL at 12:23

## 2021-10-03 RX ADMIN — Medication 1000 MILLIGRAM(S): at 00:41

## 2021-10-03 NOTE — OCCUPATIONAL THERAPY INITIAL EVALUATION ADULT - MD ORDER
45 yo male, He complains of years of neck, RUE radiculopathy, low back and Left leg radiculopathy. He has been in pain management for about 8 years currently with Dr. Delgadillo for about 2 years. He has received multiple steroid injections both cervical and lumbar, PT for years, with non lasting partial relief. Pt reports difficulty with initiating urination. pt now s/p L4-5 TLIF

## 2021-10-03 NOTE — OCCUPATIONAL THERAPY INITIAL EVALUATION ADULT - LIVES WITH, PROFILE
Pt lives with family in [facility] with elevator access. Pt at baseline is ind for ADLs and uses straight cane for functional mobility 2/2  balance.

## 2021-10-04 ENCOUNTER — TRANSCRIPTION ENCOUNTER (OUTPATIENT)
Age: 46
End: 2021-10-04

## 2021-10-04 VITALS
DIASTOLIC BLOOD PRESSURE: 78 MMHG | HEART RATE: 64 BPM | OXYGEN SATURATION: 98 % | SYSTOLIC BLOOD PRESSURE: 118 MMHG | TEMPERATURE: 99 F | RESPIRATION RATE: 17 BRPM

## 2021-10-04 DIAGNOSIS — M51.26 OTHER INTERVERTEBRAL DISC DISPLACEMENT, LUMBAR REGION: ICD-10-CM

## 2021-10-04 LAB
ANION GAP SERPL CALC-SCNC: 10 MMOL/L — SIGNIFICANT CHANGE UP (ref 5–17)
BASOPHILS # BLD AUTO: 0.03 K/UL — SIGNIFICANT CHANGE UP (ref 0–0.2)
BASOPHILS NFR BLD AUTO: 0.3 % — SIGNIFICANT CHANGE UP (ref 0–2)
BUN SERPL-MCNC: 16 MG/DL — SIGNIFICANT CHANGE UP (ref 7–23)
CALCIUM SERPL-MCNC: 9.2 MG/DL — SIGNIFICANT CHANGE UP (ref 8.4–10.5)
CHLORIDE SERPL-SCNC: 102 MMOL/L — SIGNIFICANT CHANGE UP (ref 96–108)
CO2 SERPL-SCNC: 27 MMOL/L — SIGNIFICANT CHANGE UP (ref 22–31)
CREAT SERPL-MCNC: 1.01 MG/DL — SIGNIFICANT CHANGE UP (ref 0.5–1.3)
EOSINOPHIL # BLD AUTO: 0.05 K/UL — SIGNIFICANT CHANGE UP (ref 0–0.5)
EOSINOPHIL NFR BLD AUTO: 0.5 % — SIGNIFICANT CHANGE UP (ref 0–6)
GLUCOSE SERPL-MCNC: 92 MG/DL — SIGNIFICANT CHANGE UP (ref 70–99)
HCT VFR BLD CALC: 34.5 % — LOW (ref 39–50)
HGB BLD-MCNC: 11.4 G/DL — LOW (ref 13–17)
IMM GRANULOCYTES NFR BLD AUTO: 0.3 % — SIGNIFICANT CHANGE UP (ref 0–1.5)
LYMPHOCYTES # BLD AUTO: 2.33 K/UL — SIGNIFICANT CHANGE UP (ref 1–3.3)
LYMPHOCYTES # BLD AUTO: 24.8 % — SIGNIFICANT CHANGE UP (ref 13–44)
MAGNESIUM SERPL-MCNC: 1.7 MG/DL — SIGNIFICANT CHANGE UP (ref 1.6–2.6)
MCHC RBC-ENTMCNC: 29.5 PG — SIGNIFICANT CHANGE UP (ref 27–34)
MCHC RBC-ENTMCNC: 33 GM/DL — SIGNIFICANT CHANGE UP (ref 32–36)
MCV RBC AUTO: 89.1 FL — SIGNIFICANT CHANGE UP (ref 80–100)
MONOCYTES # BLD AUTO: 0.67 K/UL — SIGNIFICANT CHANGE UP (ref 0–0.9)
MONOCYTES NFR BLD AUTO: 7.1 % — SIGNIFICANT CHANGE UP (ref 2–14)
NEUTROPHILS # BLD AUTO: 6.29 K/UL — SIGNIFICANT CHANGE UP (ref 1.8–7.4)
NEUTROPHILS NFR BLD AUTO: 67 % — SIGNIFICANT CHANGE UP (ref 43–77)
NRBC # BLD: 0 /100 WBCS — SIGNIFICANT CHANGE UP (ref 0–0)
PHOSPHATE SERPL-MCNC: 3 MG/DL — SIGNIFICANT CHANGE UP (ref 2.5–4.5)
PLATELET # BLD AUTO: 145 K/UL — LOW (ref 150–400)
POTASSIUM SERPL-MCNC: 4 MMOL/L — SIGNIFICANT CHANGE UP (ref 3.5–5.3)
POTASSIUM SERPL-SCNC: 4 MMOL/L — SIGNIFICANT CHANGE UP (ref 3.5–5.3)
RBC # BLD: 3.87 M/UL — LOW (ref 4.2–5.8)
RBC # FLD: 12.4 % — SIGNIFICANT CHANGE UP (ref 10.3–14.5)
SODIUM SERPL-SCNC: 139 MMOL/L — SIGNIFICANT CHANGE UP (ref 135–145)
WBC # BLD: 9.4 K/UL — SIGNIFICANT CHANGE UP (ref 3.8–10.5)
WBC # FLD AUTO: 9.4 K/UL — SIGNIFICANT CHANGE UP (ref 3.8–10.5)

## 2021-10-04 PROCEDURE — 99024 POSTOP FOLLOW-UP VISIT: CPT

## 2021-10-04 RX ORDER — ACETAMINOPHEN 500 MG
0 TABLET ORAL
Qty: 0 | Refills: 0 | DISCHARGE

## 2021-10-04 RX ORDER — SENNA PLUS 8.6 MG/1
2 TABLET ORAL
Qty: 0 | Refills: 0 | DISCHARGE
Start: 2021-10-04

## 2021-10-04 RX ORDER — MAGNESIUM SULFATE 500 MG/ML
2 VIAL (ML) INJECTION ONCE
Refills: 0 | Status: COMPLETED | OUTPATIENT
Start: 2021-10-04 | End: 2021-10-04

## 2021-10-04 RX ORDER — TIZANIDINE 4 MG/1
2 TABLET ORAL
Qty: 0 | Refills: 0 | DISCHARGE

## 2021-10-04 RX ORDER — METHOCARBAMOL 500 MG/1
1 TABLET, FILM COATED ORAL
Qty: 30 | Refills: 0
Start: 2021-10-04

## 2021-10-04 RX ORDER — ETODOLAC 400 MG/1
1 TABLET, FILM COATED ORAL
Qty: 0 | Refills: 0 | DISCHARGE

## 2021-10-04 RX ORDER — OXYCODONE HYDROCHLORIDE 5 MG/1
1 TABLET ORAL
Qty: 20 | Refills: 0
Start: 2021-10-04

## 2021-10-04 RX ORDER — ACETAMINOPHEN 500 MG
2 TABLET ORAL
Qty: 0 | Refills: 0 | DISCHARGE
Start: 2021-10-04

## 2021-10-04 RX ADMIN — Medication 1000 MILLIGRAM(S): at 06:15

## 2021-10-04 RX ADMIN — GABAPENTIN 600 MILLIGRAM(S): 400 CAPSULE ORAL at 05:15

## 2021-10-04 RX ADMIN — Medication 0.5 MILLIGRAM(S): at 05:17

## 2021-10-04 RX ADMIN — OXYCODONE HYDROCHLORIDE 5 MILLIGRAM(S): 5 TABLET ORAL at 03:47

## 2021-10-04 RX ADMIN — Medication 1000 MILLIGRAM(S): at 00:01

## 2021-10-04 RX ADMIN — Medication 1000 MILLIGRAM(S): at 05:15

## 2021-10-04 RX ADMIN — Medication 50 GRAM(S): at 09:17

## 2021-10-04 RX ADMIN — OXYCODONE HYDROCHLORIDE 5 MILLIGRAM(S): 5 TABLET ORAL at 09:46

## 2021-10-04 RX ADMIN — LISINOPRIL 20 MILLIGRAM(S): 2.5 TABLET ORAL at 05:15

## 2021-10-04 RX ADMIN — OXYCODONE HYDROCHLORIDE 5 MILLIGRAM(S): 5 TABLET ORAL at 04:47

## 2021-10-04 RX ADMIN — METHOCARBAMOL 500 MILLIGRAM(S): 500 TABLET, FILM COATED ORAL at 05:15

## 2021-10-04 RX ADMIN — OXYCODONE HYDROCHLORIDE 5 MILLIGRAM(S): 5 TABLET ORAL at 10:40

## 2021-10-04 NOTE — DISCHARGE NOTE PROVIDER - NSDCFUADDINST_GEN_ALL_CORE_FT
Neurosurgery follow up appointment date/time:  - Please call Dr. Alicea's office to schedule your follow up.  - The mesh on your incision will fall off on its own.  Activity:  - fatigue is common after surgery, rest if you feel tired   - no bending, lifting, twisting or heavy lifting   - walking is recommended, ambulate as tolerated  - you may shower when you get home, keep your incision dry after rinsing it.  - no bathing   - no driving within 24 hours of anesthesia or while taking prescription pain medications   - keep hydrated, drink plenty of water       Please also follow up with your primary care doctor.     Pain Expectations:  - pain after surgery is expected  - please take pain meds as prescribed     Medications:  - your muscle relaxer was changed to Robaxin; your Percocet was changed to oxycodone  - pain medications can cause constipation, you should eat a high fiber diet and may take a stool softener while on pain meds   - Avoid taking Advil (ibuprofen), Motrin (naproxen), or Aspirin for pain as they can cause bleeding     Call the office or come to ED if:  - wound has drainage or bleeding, increased redness or pain at incision site, neurological change, fever (>101), chills, night sweats, syncope, nausea/vomiting        WITHIN 24 HOURS OF DISCHARGE, PLEASE CONTACT NEURO PA  WITH ANY QUESTIONS OR CONCERNS: [phone]   OTHERWISE, PLEASE CALL THE OFFICE WITH ANY QUESTIONS OR CONCERNS: [phone]

## 2021-10-04 NOTE — PROGRESS NOTE ADULT - SUBJECTIVE AND OBJECTIVE BOX
HPI:  47 yo male, Right handed, PMH: HTN, chronic neck and back pain, Lumbar surgery 2002 unknown levels and surgeon. He complains of years of neck, RUE radiculopathy, low back and Left leg radiculopathy. He has been in pain management for about 8 years currently with Dr. Delgadillo for about 2 years. He has received multiple steroid injections both cervical and lumbar, PT for years, with non lasting partial relief. Pt reports difficulty with initiating urination. Pt ambulates with a cane.  Pt denies sob, cp, n/v, chills/fever, saddle paresthesia, dysuria. (01 Oct 2021 07:22)    Hospital Course:  10/1: S/p L4-5 TLIF. No issues postop. CT L spine postop perfomed w/intact hardware  10/2: POD1 s/p TLIF. KJ overnight. Pt reports LLE radicular pain much improved. Duran removed.     Vital Signs Last 24 Hrs  T(C): 36.2 (02 Oct 2021 00:06), Max: 37.2 (01 Oct 2021 22:55)  T(F): 97.2 (02 Oct 2021 00:06), Max: 98.9 (01 Oct 2021 22:55)  HR: 45 (02 Oct 2021 00:06) (42 - 66)  BP: 109/64 (02 Oct 2021 00:06) (106/56 - 140/65)  BP(mean): 73 (01 Oct 2021 22:00) (65 - 93)  RR: 16 (02 Oct 2021 00:06) (9 - 23)  SpO2: 97% (02 Oct 2021 00:06) (96% - 100%)    I&O's Summary    01 Oct 2021 07:01  -  02 Oct 2021 01:13  --------------------------------------------------------  IN: 1000 mL / OUT: 770 mL / NET: 230 mL        PHYSICAL EXAM:  Constitutional: NAD, well nourished  Respiratory: breathing non-labored, symmetrical chest wall movement  Cardiovascuar: bradycardic, regular rhythm +S1,S2  Gastrointestinal: abdomen soft, non tender, non distended  Neurological: A&OX3. Face symmetric, Verbal function intact, speech clear, tongue midline, EOMI, PERRL  Cranial Nerves: II-XII grossly intact  Motor: 5/5 power in b/l upper extremities and lower extremities  Sensation: symmetric and intact to light touch in all extremities  Extremities: distal pulses 2+ DP/PT  Wound/incision: dressing c/d/i    TUBES/LINES:  [] Duran  [] Lumbar Drain  [x] Wound Drains: HMV x 1 in place  [] Others    DIET:  [] NPO  [x] Mechanical  [] Tube feeds    LABS:                        14.0   9.30  )-----------( 163      ( 01 Oct 2021 13:58 )             43.2     10-01    142  |  104  |  17  ----------------------------<  137<H>  4.0   |  31  |  1.30    Ca    9.9      01 Oct 2021 13:58              CAPILLARY BLOOD GLUCOSE          Drug Levels: [] N/A    CSF Analysis: [] N/A      Allergies    No Known Allergies    Intolerances      MEDICATIONS:  Antibiotics:  ceFAZolin   IVPB 1000 milliGRAM(s) IV Intermittent every 8 hours    Neuro:  acetaminophen   Tablet .. 1000 milliGRAM(s) Oral every 6 hours  clonazePAM  Tablet 0.5 milliGRAM(s) Oral two times a day  gabapentin 600 milliGRAM(s) Oral three times a day  HYDROmorphone  Injectable 0.5 milliGRAM(s) IV Push every 30 minutes PRN  methocarbamol 500 milliGRAM(s) Oral every 8 hours  metoclopramide Injectable 10 milliGRAM(s) IV Push once PRN  ondansetron    Tablet 4 milliGRAM(s) Oral every 6 hours  oxyCODONE    IR 5 milliGRAM(s) Oral every 4 hours PRN    Anticoagulation:  enoxaparin Injectable 40 milliGRAM(s) SubCutaneous at bedtime    OTHER:  bisacodyl 5 milliGRAM(s) Oral at bedtime  cloNIDine 0.1 milliGRAM(s) Oral two times a day  lisinopril 20 milliGRAM(s) Oral daily  senna 2 Tablet(s) Oral at bedtime  Streptomycin 1 Gm/ 1 L NS 0.9% 1 Application(s) 1 Application(s) Topical every 1 hour    IVF:  lactated ringers. 1000 milliLiter(s) IV Continuous <Continuous>    CULTURES:    RADIOLOGY & ADDITIONAL TESTS:      ASSESSMENT:  47 yo male, Right handed, PMH: HTN, chronic neck and back pain with RUE and LLE radiculopathy. Lumbar surgery 2002 unknown levels and surgeon. Pt ambulates with a cane at baseline. He is now s/p L4/5 TLIF 10/1/21.    M48.061    Handoff    MEWS Score    HTN (hypertension)    H/O fatigue    Falls    HNP (herniated nucleus pulposus), lumbar    HNP (herniated nucleus pulposus), lumbar    Lumbar herniated disc    TLIF, 1 level    History of back surgery    History of back surgery    H/O: knee surgery    SysAdmin_VstLnk      Plan:  Neuro:  - VS/NC q8  - ERAS pain control  - CT Lspine complted 10/1, pending MRI C-spine post-op  - HMV x 1    Cardiovascular:  - SBP goal normotensive  - baseline bradycardia    Pulm:  - IS    GI:  - bowel reg  - reg diet    Renal:  - replete electrolytes prn  - f/u TOV    Endo:  - ISS    Heme:  - SCD's  - SQL POD1    ID:  - postop ancef    Dispo: regional status, full code, pending PT/OT  Family updated with plan    D/w Dr. Alicea    
HPI:  47 yo male, Right handed, PMH: HTN, chronic neck and back pain, Lumbar surgery 2002 unknown levels and surgeon. He complains of years of neck, RUE radiculopathy, low back and Left leg radiculopathy. He has been in pain management for about 8 years currently with Dr. Delgadillo for about 2 years. He has received multiple steroid injections both cervical and lumbar, PT for years, with non lasting partial relief. Pt reports difficulty with initiating urination. Pt ambulates with a cane.  Pt denies sob, cp, n/v, chills/fever, saddle paresthesia, dysuria. (01 Oct 2021 07:22)    Hospital Course:  10/1: S/p L4-5 TLIF. No issues postop. CT L spine postop perfomed w/intact hardware  10/2: POD1 s/p TLIF. KJ overnight. Pt reports LLE radicular pain much improved. Duran removed.   10/3: POD2 KJ overnight. Pain controlled. Neuro exam stable.  10/4: POD3 KJ overnight. Neuro exam stable.      Vital Signs Last 24 Hrs  T(C): 36.7 (03 Oct 2021 20:00), Max: 37 (03 Oct 2021 08:41)  T(F): 98 (03 Oct 2021 20:00), Max: 98.6 (03 Oct 2021 08:41)  HR: 54 (03 Oct 2021 20:00) (51 - 59)  BP: 108/68 (03 Oct 2021 20:00) (108/68 - 122/75)  BP(mean): 81 (03 Oct 2021 20:00) (81 - 81)  RR: 16 (03 Oct 2021 20:00) (16 - 18)  SpO2: 95% (03 Oct 2021 20:00) (95% - 99%)    I&O's Summary    02 Oct 2021 07:01  -  03 Oct 2021 07:00  --------------------------------------------------------  IN: 620 mL / OUT: 975 mL / NET: -355 mL    03 Oct 2021 07:01  -  04 Oct 2021 00:10  --------------------------------------------------------  IN: 420 mL / OUT: 450 mL / NET: -30 mL      PHYSICAL EXAM:  Constitutional: NAD, well nourished  Respiratory: breathing non-labored, symmetrical chest wall movement  Cardiovascuar: bradycardic, regular rhythm +S1,S2  Gastrointestinal: abdomen soft, non tender, non distended  Neurological: A&OX3. Face symmetric, Verbal function intact, speech clear, tongue midline, EOMI, PERRL  Cranial Nerves: II-XII grossly intact  Motor: 5/5 power in b/l upper extremities and lower extremities  Sensation: symmetric and intact to light touch in all extremities  Extremities: distal pulses 2+ DP/PT  Wound/incision C/D/I    TUBES/LINES:  [] Duran  [] Lumbar Drain  [x] Wound Drains: HMV x 1 in place  [] Others    DIET:  [] NPO  [x] Mechanical  [] Tube feeds    LABS:                        11.0   9.89  )-----------( 133      ( 03 Oct 2021 06:26 )             34.5     10-03    141  |  106  |  19  ----------------------------<  93  3.9   |  27  |  1.19    Ca    9.0      03 Oct 2021 06:26  Phos  3.0     10-03  Mg     1.7     10-03              CAPILLARY BLOOD GLUCOSE          Drug Levels: [] N/A    CSF Analysis: [] N/A      Allergies    No Known Allergies    Intolerances      MEDICATIONS:  Antibiotics:    Neuro:  acetaminophen   Tablet .. 1000 milliGRAM(s) Oral every 6 hours  clonazePAM  Tablet 0.5 milliGRAM(s) Oral two times a day  gabapentin 600 milliGRAM(s) Oral three times a day  HYDROmorphone  Injectable 0.5 milliGRAM(s) IV Push every 30 minutes PRN  methocarbamol 500 milliGRAM(s) Oral every 8 hours  metoclopramide Injectable 10 milliGRAM(s) IV Push once PRN  oxyCODONE    IR 5 milliGRAM(s) Oral every 4 hours PRN  sertraline 100 milliGRAM(s) Oral daily    Anticoagulation:  enoxaparin Injectable 40 milliGRAM(s) SubCutaneous at bedtime    OTHER:  cloNIDine 0.1 milliGRAM(s) Oral two times a day  lisinopril 20 milliGRAM(s) Oral daily  senna 2 Tablet(s) Oral at bedtime  Streptomycin 1 Gm/ 1 L NS 0.9% 1 Application(s) 1 Application(s) Topical every 1 hour    IVF:    CULTURES:    RADIOLOGY & ADDITIONAL TESTS:      ASSESSMENT:  47 yo male, Right handed, PMH: HTN, chronic neck and back pain with RUE and LLE radiculopathy. Lumbar surgery 2002 unknown levels and surgeon. Pt ambulates with a cane at baseline. He is now s/p L4/5 TLIF 10/1/21.    M48.061    Handoff    MEWS Score    HTN (hypertension)    H/O fatigue    Falls    HNP (herniated nucleus pulposus), lumbar    HNP (herniated nucleus pulposus), lumbar    Lumbar herniated disc    HTN (hypertension)    Postoperative state    Leukocytosis, unspecified type    Normocytic anemia    Stage 2 chronic kidney disease    Bradycardia    TLIF, 1 level    History of back surgery    History of back surgery    H/O: knee surgery    SysAdmin_VstLnk    Plan:  Neuro:  - VS/NC q8  - ERAS pain control  - CT Lspine completed 10/1, MRI C-spine completed 10/3  - HMV x 1, monitor output    Cardiovascular:  - SBP goal normotensive  - baseline bradycardia  - continue lisinopril    Pulm:  - IS    GI:  - bowel reg  - reg diet    Renal:  - replete electrolytes prn    Endo:  - ISS    Heme:  - SCD's  - SQL    ID:  - leukocytosis, monitor    Dispo: regional status, full code, PT/OT recs: home, no needs  Family updated with plan    D/w Dr. Alicea    
HPI:  47 yo male, Right handed, PMH: HTN, chronic neck and back pain, Lumbar surgery 2002 unknown levels and surgeon. He complains of years of neck, RUE radiculopathy, low back and Left leg radiculopathy. He has been in pain management for about 8 years currently with Dr. eDlgadillo for about 2 years. He has received multiple steroid injections both cervical and lumbar, PT for years, with non lasting partial relief. Pt reports difficulty with initiating urination. Pt ambulates with a cane.  Pt denies sob, cp, n/v, chills/fever, saddle paresthesia, dysuria. (01 Oct 2021 07:22)    Hospital Course:  10/1: S/p L4-5 TLIF. No issues postop. CT L spine postop perfomed w/intact hardware  10/2: POD1 s/p TLIF. KJ overnight. Pt reports LLE radicular pain much improved. Duran removed.   10/2: POD2 KJ overnight. Pain controlled. Neuro exam stable.    Vital Signs Last 24 Hrs  T(C): 37.2 (02 Oct 2021 20:29), Max: 37.2 (02 Oct 2021 20:29)  T(F): 99 (02 Oct 2021 20:29), Max: 99 (02 Oct 2021 20:29)  HR: 49 (02 Oct 2021 20:29) (47 - 56)  BP: 104/65 (02 Oct 2021 20:29) (93/59 - 118/76)  BP(mean): 78 (02 Oct 2021 20:29) (78 - 78)  RR: 16 (02 Oct 2021 20:29) (16 - 16)  SpO2: 96% (02 Oct 2021 20:29) (96% - 100%)    I&O's Summary    01 Oct 2021 07:01  -  02 Oct 2021 07:00  --------------------------------------------------------  IN: 1000 mL / OUT: 1475 mL / NET: -475 mL    02 Oct 2021 07:01  -  03 Oct 2021 00:10  --------------------------------------------------------  IN: 320 mL / OUT: 725 mL / NET: -405 mL      PHYSICAL EXAM:  Constitutional: NAD, well nourished  Respiratory: breathing non-labored, symmetrical chest wall movement  Cardiovascuar: bradycardic, regular rhythm +S1,S2  Gastrointestinal: abdomen soft, non tender, non distended  Neurological: A&OX3. Face symmetric, Verbal function intact, speech clear, tongue midline, EOMI, PERRL  Cranial Nerves: II-XII grossly intact  Motor: 5/5 power in b/l upper extremities and lower extremities  Sensation: symmetric and intact to light touch in all extremities  Extremities: distal pulses 2+ DP/PT  Wound/incision C/D/I    TUBES/LINES:  [] Duran  [] Lumbar Drain  [x] Wound Drains: HMV x 1 in place  [] Others    DIET:  [] NPO  [x] Mechanical  [] Tube feeds    LABS:                        12.1   13.98 )-----------( 164      ( 02 Oct 2021 06:39 )             36.1     10-02    140  |  104  |  22  ----------------------------<  110<H>  4.5   |  27  |  1.31<H>    Ca    9.2      02 Oct 2021 06:39  Phos  3.6     10-02  Mg     1.8     10-02              CAPILLARY BLOOD GLUCOSE          Drug Levels: [] N/A    CSF Analysis: [] N/A      Allergies    No Known Allergies    Intolerances      MEDICATIONS:  Antibiotics:    Neuro:  acetaminophen   Tablet .. 1000 milliGRAM(s) Oral every 6 hours  clonazePAM  Tablet 0.5 milliGRAM(s) Oral two times a day  gabapentin 600 milliGRAM(s) Oral three times a day  HYDROmorphone  Injectable 0.5 milliGRAM(s) IV Push every 30 minutes PRN  methocarbamol 500 milliGRAM(s) Oral every 8 hours  metoclopramide Injectable 10 milliGRAM(s) IV Push once PRN  oxyCODONE    IR 5 milliGRAM(s) Oral every 4 hours PRN    Anticoagulation:  enoxaparin Injectable 40 milliGRAM(s) SubCutaneous at bedtime    OTHER:  cloNIDine 0.1 milliGRAM(s) Oral two times a day  lisinopril 20 milliGRAM(s) Oral daily  senna 2 Tablet(s) Oral at bedtime  Streptomycin 1 Gm/ 1 L NS 0.9% 1 Application(s) 1 Application(s) Topical every 1 hour    IVF:    CULTURES:    RADIOLOGY & ADDITIONAL TESTS:      ASSESSMENT:  47 yo male, Right handed, PMH: HTN, chronic neck and back pain with RUE and LLE radiculopathy. Lumbar surgery 2002 unknown levels and surgeon. Pt ambulates with a cane at baseline. He is now s/p L4/5 TLIF 10/1/21.    M48.061    Handoff    MEWS Score    HTN (hypertension)    H/O fatigue    Falls    HNP (herniated nucleus pulposus), lumbar    HNP (herniated nucleus pulposus), lumbar    Lumbar herniated disc    HTN (hypertension)    Postoperative state    Leukocytosis, unspecified type    Normocytic anemia    Stage 2 chronic kidney disease    Bradycardia    TLIF, 1 level    History of back surgery    History of back surgery    H/O: knee surgery    SysAdmin_VstLnk      Plan:  Neuro:  - VS/NC q8  - ERAS pain control  - CT Lspine complted 10/1, pending MRI C-spine post-op  - HMV x 1, monitor output    Cardiovascular:  - SBP goal normotensive  - baseline bradycardia  - continue lisinopril    Pulm:  - IS    GI:  - bowel reg  - reg diet    Renal:  - replete electrolytes prn    Endo:  - ISS    Heme:  - SCD's  - SQL    ID:  - leukocytosis, monitor    Dispo: regional status, full code, pending PT/OT  Family updated with plan    D/w Dr. Alicea    
NEUROSURGERY POST OP NOTE:    POD# 0 S/P    S:       T(C): 36.3 (10-01-21 @ 13:22), Max: 36.3 (10-01-21 @ 13:22)  HR: 45 (10-01-21 @ 16:55) (42 - 55)  BP: 121/65 (10-01-21 @ 16:55) (112/60 - 140/65)  RR: 13 (10-01-21 @ 16:55) (9 - 23)  SpO2: 100% (10-01-21 @ 16:55) (100% - 100%)      10-01-21 @ 07:01  -  10-01-21 @ 17:27  --------------------------------------------------------  IN: 300 mL / OUT: 255 mL / NET: 45 mL        acetaminophen   Tablet .. 1000 milliGRAM(s) Oral every 6 hours  bisacodyl 5 milliGRAM(s) Oral at bedtime  ceFAZolin   IVPB 1000 milliGRAM(s) IV Intermittent every 8 hours  clonazePAM  Tablet 0.5 milliGRAM(s) Oral two times a day  gabapentin 600 milliGRAM(s) Oral three times a day  HYDROmorphone  Injectable 0.5 milliGRAM(s) IV Push every 30 minutes PRN  lactated ringers. 1000 milliLiter(s) IV Continuous <Continuous>  methocarbamol 500 milliGRAM(s) Oral every 8 hours  metoclopramide Injectable 10 milliGRAM(s) IV Push once PRN  ondansetron    Tablet 4 milliGRAM(s) Oral every 6 hours  oxyCODONE    IR 5 milliGRAM(s) Oral every 4 hours PRN  senna 2 Tablet(s) Oral at bedtime  Streptomycin 1 Gm/ 1 L NS 0.9% 1 Application(s) 1 Application(s) Topical every 1 hour      Exam:  Constitutional: NAD, well nourished  Respiratory: breathing non-labored, symmetrical chest wall movement  Cardiovascuar: bradycardic RR, +S1,S2  Gastrointestinal: abdomen soft, non tender, non distended  Neurological: A&OX3. Face symmetric, Verbal function intact, speech clear, tongue midline, EOMI, PERRL  Cranial Nerves: II-XII grossly intact  Motor: 5/5 power in b/l upper extremities and lower extremities  Sensation: symmetric and intact to light touch in all extremities  Extremities: distal pulses 2+ DP/RP  Wound/incision: dressing c/d/i  Drain: HVC x 1      Assessment:   47 yo male, Right handed, PMH: HTN, chronic neck and back pain with RUE and LLE radiculopathy. Lumbar surgery 2002 unknown levels and surgeon. Pt ambulates with a cane at baseline. He is now s/p L4/5 TLIF 10/1/21.      Plan:  Neuro:  - VS/NC q8  - ERAS pain control  - CT Lspine, MRI C-spine post-op  - HVC x 1    Cardiovascular:  - SBP goal normotensive  - baseline bradycardia    Pulm:  - IS    GI:  - bowel reg  - reg diet    Renal:  - lytes prn  - + Duran; TOV when ready    Endo:  - ISS    Heme:  - SCD's  - SQL POD1    ID:  - [email or web address]-op ancef    Dispo: pending PT/OT    D/w Dr. Alieca        Assessment:  Present when checked    []  GCS  E   V  M     Heart Failure: []Acute, [] acute on chronic , []chronic  Heart Failure:  [] Diastolic (HFpEF), [] Systolic (HFrEF), []Combined (HFpEF and HFrEF), [] RHF, [] Pulm HTN, [] Other    [] ROMARIO, [] ATN, [] AIN, [] other  [] CKD1, [] CKD2, [] CKD 3, [] CKD 4, [] CKD 5, []ESRD    Encephalopathy: [] Metabolic, [] Hepatic, [] toxic, [] Neurological, [] Other    Abnormal Nurtitional Status: [] malnurtition (see nutrition note), [ ]underweight: BMI < 19, [] morbid obesity: BMI >40, [] Cachexia    [] Sepsis  [] hypovolemic shock,[] cardiogenic shock, [] hemorrhagic shock, [] neuogenic shock  [] Acute Respiratory Failure  []Cerebral edema, [] Brain compression/ herniation,   [] Functional quadriplegia  [] Acute blood loss anemia        
Initial consult note    Patient is a 46y old  Male who presents with a chief complaint of Low back pain w/Left radiculitis (01 Oct 2021 17:26)      HPI:  45 yo male, Right handed, PMH: HTN, chronic neck and back pain, Lumbar surgery 2002 unknown levels and surgeon. He complains of years of neck, RUE radiculopathy, low back and Left leg radiculopathy. He has been in pain management for about 8 years currently with Dr. Delgadillo for about 2 years. He has received multiple steroid injections both cervical and lumbar, PT for years, with non lasting partial relief. Pt reports difficulty with initiating urination. Pt ambulates with a cane.  Pt denies sob, cp, n/v, chills/fever, saddle paresthesia, dysuria. (01 Oct 2021 07:22)    Subjective:      Allergies    No Known Allergies    Intolerances    Home meds:     MEDICATIONS  (STANDING):  acetaminophen   Tablet .. 1000 milliGRAM(s) Oral every 6 hours  bisacodyl 5 milliGRAM(s) Oral at bedtime  clonazePAM  Tablet 0.5 milliGRAM(s) Oral two times a day  cloNIDine 0.1 milliGRAM(s) Oral two times a day  enoxaparin Injectable 40 milliGRAM(s) SubCutaneous at bedtime  gabapentin 600 milliGRAM(s) Oral three times a day  lisinopril 20 milliGRAM(s) Oral daily  methocarbamol 500 milliGRAM(s) Oral every 8 hours  ondansetron    Tablet 4 milliGRAM(s) Oral every 6 hours  senna 2 Tablet(s) Oral at bedtime  Streptomycin 1 Gm/ 1 L NS 0.9% 1 Application(s) 1 Application(s) Topical every 1 hour    MEDICATIONS  (PRN):  HYDROmorphone  Injectable 0.5 milliGRAM(s) IV Push every 30 minutes PRN Severe Pain (7 - 10)  metoclopramide Injectable 10 milliGRAM(s) IV Push once PRN nausea  oxyCODONE    IR 5 milliGRAM(s) Oral every 4 hours PRN Severe Pain (7 - 10)      Drug Dosing Weight  Height (cm): 172.7 (01 Oct 2021 07:30)  Weight (kg): 71.5 (01 Oct 2021 07:30)  BMI (kg/m2): 24 (01 Oct 2021 07:30)  BSA (m2): 1.85 (01 Oct 2021 07:30)    PAST MEDICAL & SURGICAL HISTORY:  HTN (hypertension)    H/O fatigue    Falls  weakness to b/l legs; walks with cane for stability; fell down at home about a month ago    History of back surgery    History of back surgery    H/O: knee surgery  right        FAMILY HISTORY:  no reported cardiac history    SOCIAL HISTORY:    ADVANCE DIRECTIVES:    Vital Signs Last 24 Hrs  T(C): 36.8 (02 Oct 2021 04:45), Max: 37.2 (01 Oct 2021 22:55)  T(F): 98.3 (02 Oct 2021 04:45), Max: 98.9 (01 Oct 2021 22:55)  HR: 47 (02 Oct 2021 04:45) (42 - 66)  BP: 118/69 (02 Oct 2021 04:45) (106/56 - 140/65)  BP(mean): 73 (01 Oct 2021 22:00) (65 - 93)  ABP: --  ABP(mean): --  RR: 16 (02 Oct 2021 04:45) (9 - 23)  SpO2: 99% (02 Oct 2021 04:45) (96% - 100%)          I&O's Detail    01 Oct 2021 07:01  -  02 Oct 2021 07:00  --------------------------------------------------------  IN:    Lactated Ringers: 750 mL    Oral Fluid: 250 mL  Total IN: 1000 mL    OUT:    Accordian (mL): 180 mL    Indwelling Catheter - Urethral (mL): 595 mL    Voided (mL): 700 mL  Total OUT: 1475 mL    Total NET: -475 mL          PHYSICAL EXAM:      Constitutional:    Eyes:    ENMT:    Neck:    Breasts:    Back:    Respiratory:    Cardiovascular:    Gastrointestinal:    Genitourinary:    Rectal:    Extremities:    Vascular:    Neurological:    Skin:    Lymph Nodes:    Musculoskeletal:    Psychiatric:        LABS:  CBC Full  -  ( 02 Oct 2021 06:39 )  WBC Count : 13.98 K/uL  RBC Count : 4.07 M/uL  Hemoglobin : 12.1 g/dL  Hematocrit : 36.1 %  Platelet Count - Automated : 164 K/uL  Mean Cell Volume : 88.7 fl  Mean Cell Hemoglobin : 29.7 pg  Mean Cell Hemoglobin Concentration : 33.5 gm/dL  Auto Neutrophil # : 11.11 K/uL  Auto Lymphocyte # : 1.83 K/uL  Auto Monocyte # : 0.95 K/uL  Auto Eosinophil # : 0.00 K/uL  Auto Basophil # : 0.02 K/uL  Auto Neutrophil % : 79.5 %  Auto Lymphocyte % : 13.1 %  Auto Monocyte % : 6.8 %  Auto Eosinophil % : 0.0 %  Auto Basophil % : 0.1 %    10-02    140  |  104  |  22  ----------------------------<  110<H>  4.5   |  27  |  1.31<H>    Ca    9.2      02 Oct 2021 06:39  Phos  3.6     10-02  Mg     1.8     10-02      CAPILLARY BLOOD GLUCOSE              EKG:    ECHO, US:        RADIOLOGY:  < from: CT Lumbar Spine No Cont (10.01.21 @ 18:11) >  IMPRESSION:    Intact hardware status post L4-5 TLIF. Post decompressive left laminectomy and facetectomy.    < end of copied text >

## 2021-10-04 NOTE — DISCHARGE NOTE PROVIDER - NSDCFUADDAPPT_GEN_ALL_CORE_FT
Please call Dr. Alicea's office to schedule your follow up appointment.  Please call Dr. Alicea's office to schedule your follow up appointment.     Please also follow up with your primary care doctor.

## 2021-10-04 NOTE — DISCHARGE NOTE NURSING/CASE MANAGEMENT/SOCIAL WORK - NSDCFUADDAPPT_GEN_ALL_CORE_FT
Please call Dr. Alicea's office to schedule your follow up appointment.     Please also follow up with your primary care doctor.

## 2021-10-04 NOTE — DISCHARGE NOTE PROVIDER - NSDCCPCAREPLAN_GEN_ALL_CORE_FT
PRINCIPAL DISCHARGE DIAGNOSIS  Diagnosis: Lumbar herniated disc  Assessment and Plan of Treatment:       SECONDARY DISCHARGE DIAGNOSES  Diagnosis: HTN (hypertension)  Assessment and Plan of Treatment:     Diagnosis: Leukocytosis, unspecified type  Assessment and Plan of Treatment:     Diagnosis: Stage 2 chronic kidney disease  Assessment and Plan of Treatment:     Diagnosis: Normocytic anemia  Assessment and Plan of Treatment:     Diagnosis: Bradycardia  Assessment and Plan of Treatment:     Diagnosis: Lumbar herniated disc  Assessment and Plan of Treatment:

## 2021-10-04 NOTE — DISCHARGE NOTE PROVIDER - NSDCMRMEDTOKEN_GEN_ALL_CORE_FT
acetaminophen 500 mg oral tablet, disintegrating:   buprenorphine-naloxone 4 mg-1 mg sublingual film: 1 film(s) sublingual once a day  clonazePAM 0.5 mg oral tablet: 1 tab(s) orally 3 times a day  cloNIDine 0.1 mg oral tablet: 1 tab(s) orally 2 times a day  ergocalciferol 1.25 mg (50,000 intl units) oral capsule: 1 cap(s) orally once a week  etodolac 600 mg oral tablet, extended release: 1 tab(s) orally once a day  gabapentin 600 mg oral tablet: 1 tab(s) orally 3 times a day  lidocaine 0.5% topical cream:   lisinopril-hydrochlorothiazide 20 mg-12.5 mg oral tablet: 1 tab(s) orally once a day  oxycodone-acetaminophen 2.5 mg-300 mg oral tablet: 1 tab(s) orally every 6 hours  tiZANidine 4 mg oral tablet: 2 tab(s) orally every 8 hours  varenicline 1 mg oral tablet: 1 tab(s) orally 2 times a day   acetaminophen 500 mg oral tablet: 2 tab(s) orally every 6 hours, As Needed  buprenorphine-naloxone 4 mg-1 mg sublingual film: 1 film(s) sublingual once a day  clonazePAM 0.5 mg oral tablet: 1 tab(s) orally 3 times a day  cloNIDine 0.1 mg oral tablet: 1 tab(s) orally 2 times a day  ergocalciferol 1.25 mg (50,000 intl units) oral capsule: 1 cap(s) orally once a week  gabapentin 600 mg oral tablet: 1 tab(s) orally 3 times a day  lidocaine 0.5% topical cream:   lisinopril-hydrochlorothiazide 20 mg-12.5 mg oral tablet: 1 tab(s) orally once a day  methocarbamol 500 mg oral tablet: 1 tab(s) orally every 8 hours, As Needed -for muscle spasm MDD:max dose: 3 tabs/day  oxyCODONE 5 mg oral tablet: 1 tab(s) orally every 4 hours, As needed, Severe Pain (7 - 10) MDD:max dose: 6 tabs/day  senna oral tablet: 2 tab(s) orally once a day (at bedtime)  varenicline 1 mg oral tablet: 1 tab(s) orally 2 times a day

## 2021-10-04 NOTE — DISCHARGE NOTE PROVIDER - CARE PROVIDER_API CALL
Hemant Alicea)  Neurosurgery  General  [address] [place] [address], [address]  [place] [address]  Phone: [phone]  Fax: [phone]  Follow Up Time:

## 2021-10-04 NOTE — DISCHARGE NOTE NURSING/CASE MANAGEMENT/SOCIAL WORK - PATIENT PORTAL LINK FT
You can access the FollowMyHealth Patient Portal offered by [facility] by registering at the following website: [email or web address]. By joining [employer]’s FollowMyHealth portal, you will also be able to view your health information using other applications (apps) compatible with our system.

## 2021-10-04 NOTE — PROCEDURE NOTE - GENERAL PROCEDURE DETAILS
After multiple sterile preps the hemovac drain was taken off suction. Anchoring sutures were removal. The drain was carefully removed with the distal tip visualized. Steril-strips and sterile gauze dressing was placed

## 2021-10-04 NOTE — DISCHARGE NOTE PROVIDER - CARE PROVIDERS DIRECT ADDRESSES
[email or web address]

## 2021-10-04 NOTE — DISCHARGE NOTE PROVIDER - HOSPITAL COURSE
HPI:  47 yo male, Right handed, PMH: HTN, chronic neck and back pain, Lumbar surgery 2002 unknown levels and surgeon. He complains of years of neck, RUE radiculopathy, low back and Left leg radiculopathy. He has been in pain management for about 8 years currently with Dr. Delgadillo for about 2 years. He has received multiple steroid injections both cervical and lumbar, PT for years, with non lasting partial relief. Pt reports difficulty with initiating urination. Pt ambulates with a cane.  Pt denies sob, cp, n/v, chills/fever, saddle paresthesia, dysuria. (01 Oct 2021 07:22)    Hospital Course:  10/1: S/p L4-5 TLIF. No issues postop. CT L spine postop perfomed w/intact hardware  10/2: POD1 s/p TLIF. KJ overnight. Pt reports LLE radicular pain much improved. Duran removed.   10/3: POD2 KJ overnight. Pain controlled. Neuro exam stable.  10/4: POD3 KJ overnight. Neuro exam stable.  HMV dc'ed.    Patient evaluated by PT/OT who recommended: home, no needs  Patient is going home    Hospital course uncomplicated    Exam on day of discharge:  Constitutional: NAD, well nourished  Respiratory: breathing non-labored, symmetrical chest wall movement  Cardiovascuar: bradycardic, regular rhythm +S1,S2  Gastrointestinal: abdomen soft, non tender, non distended  Neurological: A&OX3. Face symmetric, Verbal function intact, speech clear, tongue midline, EOMI, PERRL  Cranial Nerves: II-XII grossly intact  Motor: 5/5 power in b/l upper extremities and lower extremities  Sensation: symmetric and intact to light touch in all extremities  Extremities: distal pulses 2+ DP/PT  Wound/incision C/D/I

## 2021-10-11 DIAGNOSIS — M51.16 INTERVERTEBRAL DISC DISORDERS WITH RADICULOPATHY, LUMBAR REGION: ICD-10-CM

## 2021-10-11 DIAGNOSIS — D64.9 ANEMIA, UNSPECIFIED: ICD-10-CM

## 2021-10-11 DIAGNOSIS — M47.26 OTHER SPONDYLOSIS WITH RADICULOPATHY, LUMBAR REGION: ICD-10-CM

## 2021-10-11 DIAGNOSIS — I12.9 HYPERTENSIVE CHRONIC KIDNEY DISEASE WITH STAGE 1 THROUGH STAGE 4 CHRONIC KIDNEY DISEASE, OR UNSPECIFIED CHRONIC KIDNEY DISEASE: ICD-10-CM

## 2021-10-11 DIAGNOSIS — N18.2 CHRONIC KIDNEY DISEASE, STAGE 2 (MILD): ICD-10-CM

## 2021-10-11 DIAGNOSIS — D72.829 ELEVATED WHITE BLOOD CELL COUNT, UNSPECIFIED: ICD-10-CM

## 2021-10-12 ENCOUNTER — APPOINTMENT (OUTPATIENT)
Dept: NEUROSURGERY | Facility: CLINIC | Age: 46
End: 2021-10-12
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
## 2021-10-12 PROBLEM — I10 ESSENTIAL (PRIMARY) HYPERTENSION: Chronic | Status: ACTIVE | Noted: 2021-09-30

## 2021-10-12 PROBLEM — Z87.898 PERSONAL HISTORY OF OTHER SPECIFIED CONDITIONS: Chronic | Status: ACTIVE | Noted: 2021-09-30

## 2021-10-15 ENCOUNTER — RESULT REVIEW (OUTPATIENT)
Age: 46
End: 2021-10-15

## 2021-10-15 ENCOUNTER — APPOINTMENT (OUTPATIENT)
Dept: NEUROSURGERY | Facility: CLINIC | Age: 46
End: 2021-10-15
Payer: MEDICAID

## 2021-10-15 VITALS
OXYGEN SATURATION: 98 % | WEIGHT: 160 LBS | HEIGHT: 68 IN | SYSTOLIC BLOOD PRESSURE: 120 MMHG | TEMPERATURE: 97 F | BODY MASS INDEX: 24.25 KG/M2 | HEART RATE: 73 BPM | DIASTOLIC BLOOD PRESSURE: 72 MMHG | RESPIRATION RATE: 18 BRPM

## 2021-10-15 DIAGNOSIS — Z48.89 ENCOUNTER FOR OTHER SPECIFIED SURGICAL AFTERCARE: ICD-10-CM

## 2021-10-15 PROCEDURE — C1713: CPT

## 2021-10-15 PROCEDURE — 84100 ASSAY OF PHOSPHORUS: CPT

## 2021-10-15 PROCEDURE — 36415 COLL VENOUS BLD VENIPUNCTURE: CPT

## 2021-10-15 PROCEDURE — 72131 CT LUMBAR SPINE W/O DYE: CPT

## 2021-10-15 PROCEDURE — 85025 COMPLETE CBC W/AUTO DIFF WBC: CPT

## 2021-10-15 PROCEDURE — 99024 POSTOP FOLLOW-UP VISIT: CPT

## 2021-10-15 PROCEDURE — 80048 BASIC METABOLIC PNL TOTAL CA: CPT

## 2021-10-15 PROCEDURE — C1889: CPT

## 2021-10-15 PROCEDURE — 97161 PT EVAL LOW COMPLEX 20 MIN: CPT

## 2021-10-15 PROCEDURE — 76000 FLUOROSCOPY <1 HR PHYS/QHP: CPT

## 2021-10-15 PROCEDURE — 83735 ASSAY OF MAGNESIUM: CPT

## 2021-10-15 PROCEDURE — 86900 BLOOD TYPING SEROLOGIC ABO: CPT

## 2021-10-15 PROCEDURE — 72141 MRI NECK SPINE W/O DYE: CPT

## 2021-10-15 PROCEDURE — 86850 RBC ANTIBODY SCREEN: CPT

## 2021-10-15 PROCEDURE — 86901 BLOOD TYPING SEROLOGIC RH(D): CPT

## 2021-10-18 PROBLEM — Z48.89 ENCOUNTER FOR POSTOPERATIVE WOUND CHECK: Status: ACTIVE | Noted: 2021-10-18

## 2021-11-05 ENCOUNTER — APPOINTMENT (OUTPATIENT)
Dept: NEUROSURGERY | Facility: CLINIC | Age: 46
End: 2021-11-05

## 2021-11-12 NOTE — DISCHARGE NOTE NURSING/CASE MANAGEMENT/SOCIAL WORK - CAREGIVER NAME
Patient Education     Allergic Rhinitis  Allergic rhinitis is an allergic reaction that affects the nose, and often the eyes. It’s often known as nasal allergies. Nasal allergies are often due to things in the environment that are breathed in. Depending what you are sensitive to, nasal allergies may occur only during certain seasons, or they may occur year round. Common indoor allergens include house dust mites, mold, cockroaches, and pet dander. Outdoor allergens include pollen from trees, grasses, and weeds.    Symptoms include a drippy, stuffy, and itchy nose. They also include sneezing and red and itchy eyes. You may feel tired more often. Severe allergies may also affect your breathing and trigger a condition called asthma.    Tests can be done to see what allergens are affecting you. You may be referred to an allergy specialist for testing and further evaluation.   Home care  Your healthcare provider may prescribe medicines to help relieve allergy symptoms. These may include oral medicines, nasal sprays, or eye drops.   Ask your provider for advice on how to stay away from substances that you are allergic to. Below are a few tips for each type of allergen.   Pet dander:  · Do not have pets with fur and feathers.  · If you have a pet, keep it out of your bedroom and off upholstered furniture.  Pollen:  · When pollen counts are high, keep windows of your car and home closed. If possible, use an air conditioner instead.  · Wear a filter mask when mowing or doing yard work.  House dust mites:  · Wash bedding every week in warm water and detergent and dry on a hot setting.  · Cover the mattress, box spring, and pillows with allergy covers.   · If possible, sleep in a room with no carpet, curtains, or upholstered furniture.  Cockroaches:  · Store food in sealed containers.  · Remove garbage from the home promptly.  · Fix water leaks.  Mold:  · Keep humidity low by using a dehumidifier or air conditioner. Keep the  dehumidifier and air conditioner clean and free of mold.  · Clean moldy areas with bleach and water. Don't mix bleach with other .  In general:  · Vacuum once or twice a week. If possible, use a vacuum with a high-efficiency particulate air (HEPA) filter.  · Don't smoke. Stay away from cigarette smoke. Cigarette smoke is an irritant that can make symptoms worse.  Follow-up care  Follow up as advised by the healthcare provider or our staff. If you were referred to an allergy specialist, make this appointment promptly.   When to seek medical advice  Call your healthcare provider or get medical care right away if the following occur:   · Coughing  · Fever of 100.4°F (38°C) or higher, or as directed by your healthcare provider  · Raised red bumps (hives)  · Continuing symptoms, new symptoms, or worsening symptoms  Call 911  Call 911 if you have:   · Trouble breathing  · Severe swelling of the face or severe itching of the eyes or mouth  · Wheezing or shortness of breath  · Chest tightness  · Dizziness or lightheadedness  · Feeling of doom  · Stomach pain, bloating, vomiting, or diarrhea  [employer] last reviewed this educational content on 10/1/2019  © 1010-8632 [employer]. All rights reserved. This information is not intended as a substitute for professional medical care. Always follow your healthcare professional's instructions.            Lauren

## 2021-11-24 NOTE — ASSESSMENT
[FreeTextEntry1] : Reviewed standard post op wound care instructions and light activity restrictions x 12 weeks from surgery.  He knows to report any adverse changes in status.  Obtain xrays in 3 months with f/u assessment at that time.\par \par 1)  Xrays Lumbar a/p - 3 months\par 2)  RTO 3 months

## 2021-11-24 NOTE — HISTORY OF PRESENT ILLNESS
[FreeTextEntry1] : 47 yo male with PMH of HTN, chronic neck/back pain, 2002 lumbar surgery, who failed conservative pain management and was indicated for surgical intervention.  Now s/p 10/1/21 L4/5 TLIF and arrives for an initial post op appt.  Post op CT L spine showed intact hardware.  Pain was controlled, neuro exam stable and d/c home 10/4/2/1.  \par He is doing well, denies headaches, nausea, vomiting, fever, chest pain, palpitations, shortness of breath, new pain, paresthesias, or weakness.\par \par The surgical incision is well healed, no signs of irritation, erythema, edema, warmth, or drainage.\par \par

## 2021-12-07 ENCOUNTER — APPOINTMENT (OUTPATIENT)
Dept: NEUROSURGERY | Facility: CLINIC | Age: 46
End: 2021-12-07

## 2022-01-03 LAB — SARS-COV-2 N GENE NPH QL NAA+PROBE: NOT DETECTED

## 2022-01-07 ENCOUNTER — APPOINTMENT (OUTPATIENT)
Dept: NEUROSURGERY | Facility: CLINIC | Age: 47
End: 2022-01-07
Payer: MEDICAID

## 2022-01-07 ENCOUNTER — OUTPATIENT (OUTPATIENT)
Dept: OUTPATIENT SERVICES | Facility: HOSPITAL | Age: 47
LOS: 1 days | End: 2022-01-07
Payer: MEDICAID

## 2022-01-07 VITALS
TEMPERATURE: 97 F | WEIGHT: 160 LBS | HEART RATE: 65 BPM | RESPIRATION RATE: 18 BRPM | SYSTOLIC BLOOD PRESSURE: 100 MMHG | DIASTOLIC BLOOD PRESSURE: 72 MMHG | OXYGEN SATURATION: 99 % | HEIGHT: 68 IN | BODY MASS INDEX: 24.25 KG/M2

## 2022-01-07 DIAGNOSIS — M54.12 RADICULOPATHY, CERVICAL REGION: ICD-10-CM

## 2022-01-07 DIAGNOSIS — M54.16 RADICULOPATHY, LUMBAR REGION: ICD-10-CM

## 2022-01-07 DIAGNOSIS — Z98.890 OTHER SPECIFIED POSTPROCEDURAL STATES: Chronic | ICD-10-CM

## 2022-01-07 DIAGNOSIS — M51.26 OTHER INTERVERTEBRAL DISC DISPLACEMENT, LUMBAR REGION: ICD-10-CM

## 2022-01-07 DIAGNOSIS — M50.20 OTHER CERVICAL DISC DISPLACEMENT, UNSPECIFIED CERVICAL REGION: ICD-10-CM

## 2022-01-07 DIAGNOSIS — Z98.1 ARTHRODESIS STATUS: ICD-10-CM

## 2022-01-07 PROCEDURE — 72100 X-RAY EXAM L-S SPINE 2/3 VWS: CPT | Mod: 26

## 2022-01-07 PROCEDURE — 72100 X-RAY EXAM L-S SPINE 2/3 VWS: CPT

## 2022-01-07 PROCEDURE — 99213 OFFICE O/P EST LOW 20 MIN: CPT

## 2022-01-07 PROCEDURE — 99072 ADDL SUPL MATRL&STAF TM PHE: CPT

## 2022-01-10 PROBLEM — M51.26 HERNIATED NUCLEUS PULPOSUS, L4-5: Status: ACTIVE | Noted: 2021-07-09

## 2022-01-10 PROBLEM — M54.16 LUMBAR RADICULOPATHY, CHRONIC: Status: ACTIVE | Noted: 2021-07-09

## 2022-01-10 PROBLEM — Z98.1 S/P LUMBAR FUSION: Status: ACTIVE | Noted: 2021-10-18

## 2022-01-10 PROBLEM — M50.20 CERVICAL DISC HERNIATION: Status: ACTIVE | Noted: 2021-07-09

## 2022-01-10 PROBLEM — M54.12 CERVICAL RADICULOPATHY: Status: ACTIVE | Noted: 2021-07-09

## 2022-01-11 NOTE — REVIEW OF SYSTEMS
[Hand Weakness] :  hand weakness [Leg Weakness] : leg weakness [Numbness] : numbness [Tingling] : tingling [Difficulty Walking] : difficulty walking [Limping] : limping [As Noted in HPI] : as noted in HPI [Limb Pain] : limb pain [Negative] : Endocrine [Abdominal Pain] : no abdominal pain [Vomiting] : no vomiting [Diarrhea] : no diarrhea [Incontinence] : no incontinence [Skin Lesions] : no skin lesions [Easy Bleeding] : no tendency for easy bleeding [Easy Bruising] : no tendency for easy bruising [de-identified] : Denies a/c, a/p

## 2022-01-11 NOTE — HISTORY OF PRESENT ILLNESS
[FreeTextEntry1] : 45 yo male with PMH of HTN, chronic neck and back pain, Lumbar surgery 2002 ([facility]), s/p 10/1/21 L4/5 TLIF who arrives for 3 month f/u with xrays for review of today.  He reports significant improvement of low back pain, now mild, and the surgical incision is well healed with no issue.  He complains of persistent known neck pain with R MRI of 10/3/21 for review.\par \par 8/20/21:  He complains of years of neck, RUE, low back and Left leg pain.  He has been in pain management for about 8 years currently with Dr. Delgadillo for about 2 years.  He has received multiple injections both cervical and lumbar, PT for years, with non lasting partial relief.  \par Also complains of gait imbalance \par \par 6/14/21 [address] [facility] - MRI lumbar wo - report scanned in allscripts\par \par Pain:\par Cervical - neck - RUE pain/ weakness of hand, atrophy, lateral forearm, 4th,5th digits\par Pain: Low back\par Left LE - severe pain\par aggravated with ambulation, standing\par Pain management:  Dr. Delgadillo - many years - multiple modalities, many injections/ ESCI - no relief\par PT many years - non lasting partial releif\par Pain medications currently include Oxycodone 5 mg, Etodolac, and Gabapentin 600 mg 1 tab qd.

## 2022-01-11 NOTE — PHYSICAL EXAM
[General Appearance - Alert] : alert [General Appearance - In No Acute Distress] : in no acute distress [Clean] : clean [Dry] : dry [Well-Healed] : well-healed [Intact] : intact [No Drainage] : without drainage [Normal Skin] : normal [Oriented To Time, Place, And Person] : oriented to person, place, and time [Impaired Insight] : insight and judgment were intact [4] : L3 quadriceps 4/5 [5] : L4/5 ankle dorsiflexors 5/5 [Antalgic] : antalgic [Sclera] : the sclera and conjunctiva were normal [Hearing Threshold Finger Rub Not Trimble] : hearing was normal [Neck Appearance] : the appearance of the neck was normal [] : no respiratory distress [Respiration, Rhythm And Depth] : normal respiratory rhythm and effort [Edema] : there was no peripheral edema [Involuntary Movements] : no involuntary movements were seen [Skin Color & Pigmentation] : normal skin color and pigmentation [Erythema] : not erythematous [Tender] : not tender [Warm] : not warm [Tremor] : no tremor present [FreeTextEntry1] : amb with cane, steady gait, mild BLE weakness, mild RUE weakness/ hand mild atrophy

## 2022-03-04 ENCOUNTER — APPOINTMENT (OUTPATIENT)
Dept: NEUROSURGERY | Facility: HOSPITAL | Age: 47
End: 2022-03-04

## 2022-03-17 ENCOUNTER — APPOINTMENT (OUTPATIENT)
Dept: NEUROSURGERY | Facility: CLINIC | Age: 47
End: 2022-03-17

## 2023-08-16 NOTE — PATIENT PROFILE ADULT - CHOOSE INDICATION TO IMMUNIZE (AN ORDER WILL BE GENERATED WHEN THIS NOTE IS SAVED):
[Takes medication as prescribed] : takes [None] : Patient does not have any barriers to medication adherence [Yes] : Reviewed medication list for presence of high-risk medications. [Benzodiazepines] : benzodiazepines Patient is not pregnant (male or female)
